# Patient Record
Sex: MALE | Race: WHITE | NOT HISPANIC OR LATINO | URBAN - METROPOLITAN AREA
[De-identification: names, ages, dates, MRNs, and addresses within clinical notes are randomized per-mention and may not be internally consistent; named-entity substitution may affect disease eponyms.]

---

## 2017-02-01 ENCOUNTER — OUTPATIENT (OUTPATIENT)
Dept: OUTPATIENT SERVICES | Facility: HOSPITAL | Age: 82
LOS: 1 days | Discharge: HOME | End: 2017-02-01

## 2017-06-27 DIAGNOSIS — C61 MALIGNANT NEOPLASM OF PROSTATE: ICD-10-CM

## 2017-06-27 PROBLEM — Z00.00 ENCOUNTER FOR PREVENTIVE HEALTH EXAMINATION: Status: ACTIVE | Noted: 2017-06-27

## 2017-08-22 ENCOUNTER — APPOINTMENT (OUTPATIENT)
Dept: VASCULAR SURGERY | Facility: CLINIC | Age: 82
End: 2017-08-22
Payer: MEDICARE

## 2017-08-22 PROCEDURE — 93880 EXTRACRANIAL BILAT STUDY: CPT

## 2017-08-22 PROCEDURE — 99213 OFFICE O/P EST LOW 20 MIN: CPT

## 2018-01-29 ENCOUNTER — OUTPATIENT (OUTPATIENT)
Dept: OUTPATIENT SERVICES | Facility: HOSPITAL | Age: 83
LOS: 1 days | Discharge: HOME | End: 2018-01-29

## 2018-01-29 DIAGNOSIS — C61 MALIGNANT NEOPLASM OF PROSTATE: ICD-10-CM

## 2018-02-04 DIAGNOSIS — M17.10 UNILATERAL PRIMARY OSTEOARTHRITIS, UNSPECIFIED KNEE: ICD-10-CM

## 2018-08-01 ENCOUNTER — APPOINTMENT (OUTPATIENT)
Dept: UROLOGY | Facility: CLINIC | Age: 83
End: 2018-08-01

## 2018-08-14 ENCOUNTER — APPOINTMENT (OUTPATIENT)
Dept: VASCULAR SURGERY | Facility: CLINIC | Age: 83
End: 2018-08-14
Payer: MEDICARE

## 2018-08-14 VITALS
SYSTOLIC BLOOD PRESSURE: 110 MMHG | DIASTOLIC BLOOD PRESSURE: 60 MMHG | WEIGHT: 142 LBS | HEIGHT: 67 IN | BODY MASS INDEX: 22.29 KG/M2

## 2018-08-14 PROCEDURE — 93880 EXTRACRANIAL BILAT STUDY: CPT

## 2018-08-14 PROCEDURE — 99213 OFFICE O/P EST LOW 20 MIN: CPT

## 2018-08-31 ENCOUNTER — APPOINTMENT (OUTPATIENT)
Dept: UROLOGY | Facility: CLINIC | Age: 83
End: 2018-08-31
Payer: MEDICARE

## 2018-08-31 VITALS
DIASTOLIC BLOOD PRESSURE: 61 MMHG | WEIGHT: 142 LBS | HEART RATE: 65 BPM | SYSTOLIC BLOOD PRESSURE: 144 MMHG | BODY MASS INDEX: 22.29 KG/M2 | HEIGHT: 67 IN

## 2018-08-31 DIAGNOSIS — Z86.79 PERSONAL HISTORY OF OTHER DISEASES OF THE CIRCULATORY SYSTEM: ICD-10-CM

## 2018-08-31 DIAGNOSIS — Z95.5 PRESENCE OF CORONARY ANGIOPLASTY IMPLANT AND GRAFT: ICD-10-CM

## 2018-08-31 DIAGNOSIS — Z78.9 OTHER SPECIFIED HEALTH STATUS: ICD-10-CM

## 2018-08-31 DIAGNOSIS — E11.9 TYPE 2 DIABETES MELLITUS W/OUT COMPLICATIONS: ICD-10-CM

## 2018-08-31 DIAGNOSIS — M10.9 GOUT, UNSPECIFIED: ICD-10-CM

## 2018-08-31 DIAGNOSIS — C61 MALIGNANT NEOPLASM OF PROSTATE: ICD-10-CM

## 2018-08-31 LAB
BILIRUB UR QL STRIP: NORMAL
CLARITY UR: CLEAR
COLLECTION METHOD: NORMAL
GLUCOSE UR-MCNC: NORMAL
HCG UR QL: NORMAL EU/DL
HGB UR QL STRIP.AUTO: NORMAL
KETONES UR-MCNC: NORMAL
LEUKOCYTE ESTERASE UR QL STRIP: 500
NITRITE UR QL STRIP: NORMAL
PH UR STRIP: 5
PROT UR STRIP-MCNC: NORMAL
SP GR UR STRIP: 1.02

## 2018-08-31 PROCEDURE — 99213 OFFICE O/P EST LOW 20 MIN: CPT

## 2018-08-31 PROCEDURE — 81003 URINALYSIS AUTO W/O SCOPE: CPT | Mod: QW

## 2018-08-31 NOTE — HISTORY OF PRESENT ILLNESS
[None] : None [FreeTextEntry1] : Keegan is a 89 year old male, with a history of Prostate cancer, s/p brachytherapy in 2004 for [place] 6 cancer. His pre treatment PSA was 6.8 ng/mL. His most recent PSA is 0.01 , from January 2018. Currently, he states he is voiding well and denies any significant irritative/obstructive voiding symptoms. He does have a history of renal stones, likely uric acid, s/p dissolution therapy in the past. He denies any episodes of gross hematuria, renal colic, abdominal/flank pain, dysuria, or further urological/constitutional symptoms.

## 2018-08-31 NOTE — ASSESSMENT
[FreeTextEntry1] : Keegan is a 89 year old male with undetectable PSA s/p brachytherapy in 2004. He is voiding well without any significant irritative/obstructive voiding symptoms. He will follow up in January for PSA testing.

## 2019-01-09 ENCOUNTER — OUTPATIENT (OUTPATIENT)
Dept: OUTPATIENT SERVICES | Facility: HOSPITAL | Age: 84
LOS: 1 days | Discharge: HOME | End: 2019-01-09

## 2019-01-09 DIAGNOSIS — D51.9 VITAMIN B12 DEFICIENCY ANEMIA, UNSPECIFIED: ICD-10-CM

## 2019-01-09 DIAGNOSIS — R94.6 ABNORMAL RESULTS OF THYROID FUNCTION STUDIES: ICD-10-CM

## 2019-01-09 DIAGNOSIS — D52.9 FOLATE DEFICIENCY ANEMIA, UNSPECIFIED: ICD-10-CM

## 2019-01-09 DIAGNOSIS — A53.9 SYPHILIS, UNSPECIFIED: ICD-10-CM

## 2019-01-11 ENCOUNTER — APPOINTMENT (OUTPATIENT)
Dept: UROLOGY | Facility: CLINIC | Age: 84
End: 2019-01-11

## 2019-06-25 ENCOUNTER — APPOINTMENT (OUTPATIENT)
Dept: UROLOGY | Facility: CLINIC | Age: 84
End: 2019-06-25
Payer: MEDICARE

## 2019-06-25 VITALS
HEIGHT: 67 IN | WEIGHT: 142 LBS | SYSTOLIC BLOOD PRESSURE: 162 MMHG | BODY MASS INDEX: 22.29 KG/M2 | HEART RATE: 50 BPM | DIASTOLIC BLOOD PRESSURE: 68 MMHG

## 2019-06-25 DIAGNOSIS — C61 MALIGNANT NEOPLASM OF PROSTATE: ICD-10-CM

## 2019-06-25 PROCEDURE — 99213 OFFICE O/P EST LOW 20 MIN: CPT

## 2019-06-25 NOTE — HISTORY OF PRESENT ILLNESS
[FreeTextEntry1] : 89-year-old with history of prostate cancer Status postBrachii therapy in 2004 for [place] 6 and a pretreatment PSA of 6.8. New PSA is 0.1. He feels well without any frequency or urgency, no dysuria, no gross hematuria, no flank pain, no intermittency.

## 2019-06-25 NOTE — PHYSICAL EXAM
[General Appearance - In No Acute Distress] : no acute distress [Prostate Enlargement] : the prostate was not enlarged [Prostate Tenderness] : the prostate was not tender [No Prostate Nodules] : no prostate nodules [] : no respiratory distress [Oriented To Time, Place, And Person] : oriented to person, place, and time [Normal Station and Gait] : the gait and station were normal for the patient's age

## 2019-10-28 ENCOUNTER — APPOINTMENT (OUTPATIENT)
Dept: CARDIOLOGY | Facility: CLINIC | Age: 84
End: 2019-10-28
Payer: MEDICARE

## 2019-10-28 PROCEDURE — 99214 OFFICE O/P EST MOD 30 MIN: CPT

## 2019-10-28 PROCEDURE — 93000 ELECTROCARDIOGRAM COMPLETE: CPT

## 2019-12-11 ENCOUNTER — APPOINTMENT (OUTPATIENT)
Dept: NEUROLOGY | Facility: CLINIC | Age: 84
End: 2019-12-11
Payer: MEDICARE

## 2019-12-11 VITALS
SYSTOLIC BLOOD PRESSURE: 192 MMHG | HEIGHT: 67 IN | TEMPERATURE: 97.4 F | BODY MASS INDEX: 23.23 KG/M2 | OXYGEN SATURATION: 90 % | WEIGHT: 148 LBS | HEART RATE: 56 BPM | DIASTOLIC BLOOD PRESSURE: 84 MMHG

## 2019-12-11 PROCEDURE — 99213 OFFICE O/P EST LOW 20 MIN: CPT

## 2019-12-11 NOTE — HISTORY OF PRESENT ILLNESS
[FreeTextEntry1] : Pt seen today in f/u for dementia. Is in [facility] now.  Rides elevator up and down.  Meals prepared for him.  Good social interaction.  Very active.  Loves playing cards, making money through winning ($44!).  \par Son took me aside and is very happy with how father is doing in [facility].  Says prior to him going was probably drinking ETOH now no ETOH and flourishing in structured safe environment with many activities and social interaction.

## 2019-12-11 NOTE — PHYSICAL EXAM
[FreeTextEntry1] : Patient very alert and conversational.  He tells me about the card games and that they have him do the dealing.  Tells me he likes to count cards and was always good with numbers.  Repeats himself a few times during the exam.  \par \par Son notes he did not tolerate the 10 mg/day donepezil so decreased back to 5 mg.\par \par *** note that manual BP taken left arm sitting and reading of 165/85 obtained.\par Son reports he had normal BP at PCP's and cardiologists recently.

## 2019-12-11 NOTE — ASSESSMENT
[FreeTextEntry1] : Alzeimer type dementia of moderate severity, slow progression.  He is doing very well at [facility].   No changes in medication are warrented at this time though I did explained memantine was option in future.  Will return in 6 months.  Donepezil 5 mg/day was refilled.

## 2020-09-18 ENCOUNTER — APPOINTMENT (OUTPATIENT)
Dept: NEUROLOGY | Facility: CLINIC | Age: 85
End: 2020-09-18
Payer: MEDICARE

## 2020-09-18 VITALS
WEIGHT: 148 LBS | OXYGEN SATURATION: 98 % | SYSTOLIC BLOOD PRESSURE: 191 MMHG | TEMPERATURE: 98 F | HEART RATE: 80 BPM | DIASTOLIC BLOOD PRESSURE: 90 MMHG | BODY MASS INDEX: 23.23 KG/M2 | HEIGHT: 67 IN

## 2020-09-18 VITALS — HEART RATE: 71 BPM | DIASTOLIC BLOOD PRESSURE: 90 MMHG | SYSTOLIC BLOOD PRESSURE: 182 MMHG

## 2020-09-18 PROCEDURE — 99214 OFFICE O/P EST MOD 30 MIN: CPT

## 2020-09-18 NOTE — PHYSICAL EXAM
[FreeTextEntry1] : BP = 164/77 P = 60\par \par Speech is fluent, talks about cards a lot.\par Gait wide based, turns mildly unsteady.\par \par Month is ?\par Year is ?  Born [birth year]\par YOSHI Rebollar (states knew him when he was 2 or 3 year old came into his store trying to bargain for things).\par \par

## 2020-09-18 NOTE — ASSESSMENT
[FreeTextEntry1] : Alzheimer disease, stable on low dose donepezil.  No medication changes needed at this time.\par Return in 6 months.  Encouraged to continue to use walker at night.

## 2020-09-18 NOTE — HISTORY OF PRESENT ILLNESS
[FreeTextEntry1] : Pt 89 yo male with dementia, AD, no behavior disturbance.  [facility] but Cov[facility] restrictions put card playing on hold.  BP tends to be higher in doctors office.  Goes up when walks from car to office.\par \par Takes meds, Has pill box which his son loads for him.  Son states no errors.  No falls.\par Has a walker, uses it to go to bathroom at night.  Has night lights.\par \par

## 2020-10-08 ENCOUNTER — RECORD ABSTRACTING (OUTPATIENT)
Age: 85
End: 2020-10-08

## 2020-10-08 DIAGNOSIS — Z86.79 PERSONAL HISTORY OF OTHER DISEASES OF THE CIRCULATORY SYSTEM: ICD-10-CM

## 2020-10-08 DIAGNOSIS — I21.4 NON-ST ELEVATION (NSTEMI) MYOCARDIAL INFARCTION: ICD-10-CM

## 2020-10-08 DIAGNOSIS — Z87.891 PERSONAL HISTORY OF NICOTINE DEPENDENCE: ICD-10-CM

## 2020-10-08 DIAGNOSIS — I49.3 VENTRICULAR PREMATURE DEPOLARIZATION: ICD-10-CM

## 2020-10-08 DIAGNOSIS — Z86.39 PERSONAL HISTORY OF OTHER ENDOCRINE, NUTRITIONAL AND METABOLIC DISEASE: ICD-10-CM

## 2020-10-08 DIAGNOSIS — Z87.39 PERSONAL HISTORY OF OTHER DISEASES OF THE MUSCULOSKELETAL SYSTEM AND CONNECTIVE TISSUE: ICD-10-CM

## 2020-10-08 DIAGNOSIS — Z85.46 PERSONAL HISTORY OF MALIGNANT NEOPLASM OF PROSTATE: ICD-10-CM

## 2020-10-08 RX ORDER — HYDROCHLOROTHIAZIDE 12.5 MG/1
12.5 CAPSULE ORAL DAILY
Refills: 0 | Status: ACTIVE | COMMUNITY

## 2020-10-08 RX ORDER — GLYBURIDE 1.25 MG/1
1.25 TABLET ORAL DAILY
Refills: 0 | Status: ACTIVE | COMMUNITY

## 2020-10-08 RX ORDER — VALSARTAN 320 MG/1
320 TABLET ORAL DAILY
Refills: 0 | Status: ACTIVE | COMMUNITY

## 2020-10-08 RX ORDER — ASPIRIN 81 MG
81 TABLET, DELAYED RELEASE (ENTERIC COATED) ORAL DAILY
Refills: 0 | Status: ACTIVE | COMMUNITY

## 2020-10-08 RX ORDER — METOPROLOL TARTRATE 100 MG/1
100 TABLET, FILM COATED ORAL DAILY
Refills: 0 | Status: ACTIVE | COMMUNITY

## 2020-10-08 RX ORDER — CLOPIDOGREL 75 MG/1
75 TABLET, FILM COATED ORAL DAILY
Qty: 90 | Refills: 3 | Status: ACTIVE | COMMUNITY

## 2020-10-08 RX ORDER — SIMVASTATIN 40 MG/1
40 TABLET, FILM COATED ORAL DAILY
Refills: 0 | Status: ACTIVE | COMMUNITY

## 2020-10-08 RX ORDER — NITROGLYCERIN 0.4 MG/1
0.4 TABLET SUBLINGUAL
Refills: 0 | Status: ACTIVE | COMMUNITY

## 2020-10-08 RX ORDER — AMLODIPINE BESYLATE 2.5 MG/1
2.5 TABLET ORAL DAILY
Refills: 0 | Status: ACTIVE | COMMUNITY

## 2021-03-22 ENCOUNTER — APPOINTMENT (OUTPATIENT)
Dept: NEUROLOGY | Facility: CLINIC | Age: 86
End: 2021-03-22
Payer: MEDICARE

## 2021-03-22 VITALS
DIASTOLIC BLOOD PRESSURE: 93 MMHG | HEIGHT: 66 IN | OXYGEN SATURATION: 96 % | BODY MASS INDEX: 22.82 KG/M2 | WEIGHT: 142 LBS | SYSTOLIC BLOOD PRESSURE: 172 MMHG | TEMPERATURE: 97.7 F | HEART RATE: 97 BPM

## 2021-03-22 DIAGNOSIS — G30.9 ALZHEIMER'S DISEASE, UNSPECIFIED: ICD-10-CM

## 2021-03-22 DIAGNOSIS — F02.80 ALZHEIMER'S DISEASE, UNSPECIFIED: ICD-10-CM

## 2021-03-22 PROCEDURE — 99213 OFFICE O/P EST LOW 20 MIN: CPT

## 2021-03-22 RX ORDER — ALLOPURINOL 200 MG/1
TABLET ORAL DAILY
Refills: 0 | Status: ACTIVE | COMMUNITY

## 2021-03-22 NOTE — HISTORY OF PRESENT ILLNESS
[FreeTextEntry1] : Pt presents for f/u of AD.  He is taking donepezil and last time at cardiologist no worsening of his condition.\par \par In assisted living at [facility].  They clean his room and do his laundry.  Goes down Wed, Friday, Sunday for meals.   Had both vaccines (second shot was a week ago).  Son states they will be opening up to card games and visitors starting April 1.\par

## 2021-03-22 NOTE — ASSESSMENT
[FreeTextEntry1] : Dementia, stable.  He is on low dose donepezil and tolerating it (no bradycardia, pulse was 97 today).\par Given that he is on metoprolol and otherwise doing well I would not recommend any change in dosing of donepezil.  Also I would not add memantine at this time.\par \par I encouraged him to remain physically and socially active.\par Refill of donepezil 5 mg tabs provided.\par Return in 6 months.

## 2021-06-14 ENCOUNTER — APPOINTMENT (OUTPATIENT)
Dept: CARDIOLOGY | Facility: CLINIC | Age: 86
End: 2021-06-14
Payer: MEDICARE

## 2021-06-14 VITALS
DIASTOLIC BLOOD PRESSURE: 80 MMHG | WEIGHT: 145 LBS | BODY MASS INDEX: 23.3 KG/M2 | TEMPERATURE: 96.7 F | SYSTOLIC BLOOD PRESSURE: 130 MMHG | HEIGHT: 66 IN | HEART RATE: 55 BPM

## 2021-06-14 DIAGNOSIS — I10 ESSENTIAL (PRIMARY) HYPERTENSION: ICD-10-CM

## 2021-06-14 DIAGNOSIS — E78.2 MIXED HYPERLIPIDEMIA: ICD-10-CM

## 2021-06-14 DIAGNOSIS — I65.23 OCCLUSION AND STENOSIS OF BILATERAL CAROTID ARTERIES: ICD-10-CM

## 2021-06-14 DIAGNOSIS — I25.10 ATHEROSCLEROTIC HEART DISEASE OF NATIVE CORONARY ARTERY W/OUT ANGINA PECTORIS: ICD-10-CM

## 2021-06-14 PROCEDURE — 99213 OFFICE O/P EST LOW 20 MIN: CPT

## 2021-06-14 PROCEDURE — 93000 ELECTROCARDIOGRAM COMPLETE: CPT

## 2021-06-14 NOTE — ASSESSMENT
[FreeTextEntry1] :  S/P NSTEMI 08\par S/P stent LAD and RCA\par always flat st changes laterally \par Dx with Alzheimers\par HTN\par sinus josie at 50-55\par NIDDM\par PVC \par carotid left surg\par Gout \par Prostate Ca  \par clinically stable\par wife  2018 due to myeloma \par may need to decrease metoptolol and aricept  in future for now no change heart rate 55 \par \par

## 2021-12-10 ENCOUNTER — APPOINTMENT (OUTPATIENT)
Dept: NEUROLOGY | Facility: CLINIC | Age: 86
End: 2021-12-10
Payer: MEDICARE

## 2021-12-10 VITALS
SYSTOLIC BLOOD PRESSURE: 194 MMHG | HEIGHT: 66 IN | BODY MASS INDEX: 23.95 KG/M2 | DIASTOLIC BLOOD PRESSURE: 82 MMHG | TEMPERATURE: 98.1 F | WEIGHT: 149 LBS | OXYGEN SATURATION: 98 % | HEART RATE: 74 BPM

## 2021-12-10 DIAGNOSIS — G30.1 ALZHEIMER'S DISEASE WITH LATE ONSET: ICD-10-CM

## 2021-12-10 DIAGNOSIS — F02.80 ALZHEIMER'S DISEASE WITH LATE ONSET: ICD-10-CM

## 2021-12-10 PROCEDURE — 99214 OFFICE O/P EST MOD 30 MIN: CPT

## 2021-12-10 RX ORDER — MEMANTINE HYDROCHLORIDE 5 MG/1
5 TABLET, FILM COATED ORAL DAILY
Qty: 90 | Refills: 3 | Status: ACTIVE | COMMUNITY
Start: 2021-12-10 | End: 1900-01-01

## 2021-12-10 RX ORDER — DONEPEZIL HYDROCHLORIDE 5 MG/1
5 TABLET ORAL
Qty: 90 | Refills: 3 | Status: ACTIVE | COMMUNITY
Start: 2019-12-11 | End: 1900-01-01

## 2021-12-10 NOTE — PHYSICAL EXAM
[FreeTextEntry1] : Patient is alert,  repeats self several times during exam.  He is in good spirits.\par Follows commands.\par Has good upper and lower extremity strength.

## 2021-12-10 NOTE — HISTORY OF PRESENT ILLNESS
[FreeTextEntry1] : Pt presents for f/u of AD.  He comes in with his son who notes some further decline in short term memory.\par

## 2021-12-10 NOTE — ASSESSMENT
[FreeTextEntry1] : Alzheimer type dementia, continued gradual progression. \par He is doing well, enjoys playing cards and reminiscing about past.\par Stays physically and socially active.\par BP today was high but son says it is normal in most other doctors offices.\par \par Plan:\par 1.  Continue donepezil 5 mg/day.\par 2.  F/u with PCP for treatment of hypertension.\par 3.  Begin memantine 5 mg /day after ensuring BP control improved.\par 4.  Return in 6 months.

## 2022-01-10 ENCOUNTER — APPOINTMENT (OUTPATIENT)
Dept: CARDIOLOGY | Facility: CLINIC | Age: 87
End: 2022-01-10

## 2022-04-22 ENCOUNTER — APPOINTMENT (OUTPATIENT)
Dept: CARDIOLOGY | Facility: CLINIC | Age: 87
End: 2022-04-22

## 2022-05-13 ENCOUNTER — INPATIENT (INPATIENT)
Facility: HOSPITAL | Age: 87
LOS: 5 days | Discharge: NURSING, REHAB OR LONG-TERM CARE | End: 2022-05-19
Attending: HOSPITALIST | Admitting: HOSPITALIST
Payer: MEDICARE

## 2022-05-13 VITALS
TEMPERATURE: 99 F | HEART RATE: 105 BPM | DIASTOLIC BLOOD PRESSURE: 81 MMHG | RESPIRATION RATE: 16 BRPM | OXYGEN SATURATION: 98 % | SYSTOLIC BLOOD PRESSURE: 135 MMHG

## 2022-05-13 LAB
A1C WITH ESTIMATED AVERAGE GLUCOSE RESULT: 7.3 % — HIGH (ref 4–5.6)
ALBUMIN SERPL ELPH-MCNC: 3.8 G/DL — SIGNIFICANT CHANGE UP (ref 3.5–5.2)
ALBUMIN SERPL ELPH-MCNC: 4.6 G/DL — SIGNIFICANT CHANGE UP (ref 3.5–5.2)
ALP SERPL-CCNC: 74 U/L — SIGNIFICANT CHANGE UP (ref 30–115)
ALP SERPL-CCNC: 83 U/L — SIGNIFICANT CHANGE UP (ref 30–115)
ALT FLD-CCNC: 20 U/L — SIGNIFICANT CHANGE UP (ref 0–41)
ALT FLD-CCNC: 21 U/L — SIGNIFICANT CHANGE UP (ref 0–41)
ANION GAP SERPL CALC-SCNC: 17 MMOL/L — HIGH (ref 7–14)
ANION GAP SERPL CALC-SCNC: 18 MMOL/L — HIGH (ref 7–14)
APPEARANCE UR: ABNORMAL
APTT BLD: 36 SEC — SIGNIFICANT CHANGE UP (ref 27–39.2)
AST SERPL-CCNC: 39 U/L — SIGNIFICANT CHANGE UP (ref 0–41)
AST SERPL-CCNC: 45 U/L — HIGH (ref 0–41)
BACTERIA # UR AUTO: ABNORMAL
BASE EXCESS BLDV CALC-SCNC: -1 MMOL/L — SIGNIFICANT CHANGE UP (ref -2–3)
BASOPHILS # BLD AUTO: 0.02 K/UL — SIGNIFICANT CHANGE UP (ref 0–0.2)
BASOPHILS # BLD AUTO: 0.02 K/UL — SIGNIFICANT CHANGE UP (ref 0–0.2)
BASOPHILS NFR BLD AUTO: 0.1 % — SIGNIFICANT CHANGE UP (ref 0–1)
BASOPHILS NFR BLD AUTO: 0.2 % — SIGNIFICANT CHANGE UP (ref 0–1)
BILIRUB SERPL-MCNC: 1.7 MG/DL — HIGH (ref 0.2–1.2)
BILIRUB SERPL-MCNC: 1.9 MG/DL — HIGH (ref 0.2–1.2)
BILIRUB UR-MCNC: NEGATIVE — SIGNIFICANT CHANGE UP
BLD GP AB SCN SERPL QL: SIGNIFICANT CHANGE UP
BUN SERPL-MCNC: 24 MG/DL — HIGH (ref 10–20)
BUN SERPL-MCNC: 26 MG/DL — HIGH (ref 10–20)
CA-I SERPL-SCNC: 1.09 MMOL/L — LOW (ref 1.15–1.33)
CALCIUM SERPL-MCNC: 9 MG/DL — SIGNIFICANT CHANGE UP (ref 8.5–10.1)
CALCIUM SERPL-MCNC: 9 MG/DL — SIGNIFICANT CHANGE UP (ref 8.5–10.1)
CHLORIDE SERPL-SCNC: 100 MMOL/L — SIGNIFICANT CHANGE UP (ref 98–110)
CHLORIDE SERPL-SCNC: 99 MMOL/L — SIGNIFICANT CHANGE UP (ref 98–110)
CK SERPL-CCNC: 1332 U/L — HIGH (ref 0–225)
CK SERPL-CCNC: 1999 U/L — HIGH (ref 0–225)
CO2 SERPL-SCNC: 20 MMOL/L — SIGNIFICANT CHANGE UP (ref 17–32)
CO2 SERPL-SCNC: 21 MMOL/L — SIGNIFICANT CHANGE UP (ref 17–32)
COLOR SPEC: YELLOW — SIGNIFICANT CHANGE UP
CREAT SERPL-MCNC: 1 MG/DL — SIGNIFICANT CHANGE UP (ref 0.7–1.5)
CREAT SERPL-MCNC: 1.1 MG/DL — SIGNIFICANT CHANGE UP (ref 0.7–1.5)
DIFF PNL FLD: ABNORMAL
EGFR: 62 ML/MIN/1.73M2 — SIGNIFICANT CHANGE UP
EGFR: 70 ML/MIN/1.73M2 — SIGNIFICANT CHANGE UP
EOSINOPHIL # BLD AUTO: 0 K/UL — SIGNIFICANT CHANGE UP (ref 0–0.7)
EOSINOPHIL # BLD AUTO: 0.03 K/UL — SIGNIFICANT CHANGE UP (ref 0–0.7)
EOSINOPHIL NFR BLD AUTO: 0 % — SIGNIFICANT CHANGE UP (ref 0–8)
EOSINOPHIL NFR BLD AUTO: 0.2 % — SIGNIFICANT CHANGE UP (ref 0–8)
EPI CELLS # UR: 0 /HPF — SIGNIFICANT CHANGE UP (ref 0–5)
ESTIMATED AVERAGE GLUCOSE: 163 MG/DL — HIGH (ref 68–114)
ETHANOL SERPL-MCNC: <10 MG/DL — SIGNIFICANT CHANGE UP
GAS PNL BLDV: 130 MMOL/L — LOW (ref 136–145)
GAS PNL BLDV: SIGNIFICANT CHANGE UP
GLUCOSE SERPL-MCNC: 176 MG/DL — HIGH (ref 70–99)
GLUCOSE SERPL-MCNC: 220 MG/DL — HIGH (ref 70–99)
GLUCOSE UR QL: ABNORMAL
HCO3 BLDV-SCNC: 25 MMOL/L — SIGNIFICANT CHANGE UP (ref 22–29)
HCT VFR BLD CALC: 47.2 % — SIGNIFICANT CHANGE UP (ref 42–52)
HCT VFR BLD CALC: 49.2 % — SIGNIFICANT CHANGE UP (ref 42–52)
HCT VFR BLDA CALC: 51 % — SIGNIFICANT CHANGE UP (ref 39–51)
HGB BLD CALC-MCNC: 17 G/DL — SIGNIFICANT CHANGE UP (ref 12.6–17.4)
HGB BLD-MCNC: 16.8 G/DL — SIGNIFICANT CHANGE UP (ref 14–18)
HGB BLD-MCNC: 17.8 G/DL — SIGNIFICANT CHANGE UP (ref 14–18)
HYALINE CASTS # UR AUTO: 1 /LPF — SIGNIFICANT CHANGE UP (ref 0–7)
IMM GRANULOCYTES NFR BLD AUTO: 0.6 % — HIGH (ref 0.1–0.3)
IMM GRANULOCYTES NFR BLD AUTO: 0.8 % — HIGH (ref 0.1–0.3)
INR BLD: 0.99 RATIO — SIGNIFICANT CHANGE UP (ref 0.65–1.3)
KETONES UR-MCNC: ABNORMAL
LACTATE BLDV-MCNC: 3.1 MMOL/L — HIGH (ref 0.5–2)
LACTATE SERPL-SCNC: 3.4 MMOL/L — HIGH (ref 0.7–2)
LACTATE SERPL-SCNC: 4.1 MMOL/L — CRITICAL HIGH (ref 0.7–2)
LEUKOCYTE ESTERASE UR-ACNC: ABNORMAL
LYMPHOCYTES # BLD AUTO: 0.53 K/UL — LOW (ref 1.2–3.4)
LYMPHOCYTES # BLD AUTO: 0.73 K/UL — LOW (ref 1.2–3.4)
LYMPHOCYTES # BLD AUTO: 3.3 % — LOW (ref 20.5–51.1)
LYMPHOCYTES # BLD AUTO: 5.8 % — LOW (ref 20.5–51.1)
MAGNESIUM SERPL-MCNC: 1.7 MG/DL — LOW (ref 1.8–2.4)
MCHC RBC-ENTMCNC: 29.8 PG — SIGNIFICANT CHANGE UP (ref 27–31)
MCHC RBC-ENTMCNC: 30.4 PG — SIGNIFICANT CHANGE UP (ref 27–31)
MCHC RBC-ENTMCNC: 35.6 G/DL — SIGNIFICANT CHANGE UP (ref 32–37)
MCHC RBC-ENTMCNC: 36.2 G/DL — SIGNIFICANT CHANGE UP (ref 32–37)
MCV RBC AUTO: 83.8 FL — SIGNIFICANT CHANGE UP (ref 80–94)
MCV RBC AUTO: 84 FL — SIGNIFICANT CHANGE UP (ref 80–94)
MONOCYTES # BLD AUTO: 0.96 K/UL — HIGH (ref 0.1–0.6)
MONOCYTES # BLD AUTO: 1.18 K/UL — HIGH (ref 0.1–0.6)
MONOCYTES NFR BLD AUTO: 6.1 % — SIGNIFICANT CHANGE UP (ref 1.7–9.3)
MONOCYTES NFR BLD AUTO: 9.4 % — HIGH (ref 1.7–9.3)
NEUTROPHILS # BLD AUTO: 10.48 K/UL — HIGH (ref 1.4–6.5)
NEUTROPHILS # BLD AUTO: 14.21 K/UL — HIGH (ref 1.4–6.5)
NEUTROPHILS NFR BLD AUTO: 83.8 % — HIGH (ref 42.2–75.2)
NEUTROPHILS NFR BLD AUTO: 89.7 % — HIGH (ref 42.2–75.2)
NITRITE UR-MCNC: POSITIVE
NRBC # BLD: 0 /100 WBCS — SIGNIFICANT CHANGE UP (ref 0–0)
NRBC # BLD: 0 /100 WBCS — SIGNIFICANT CHANGE UP (ref 0–0)
PCO2 BLDV: 47 MMHG — SIGNIFICANT CHANGE UP (ref 42–55)
PH BLDV: 7.34 — SIGNIFICANT CHANGE UP (ref 7.32–7.43)
PH UR: 6 — SIGNIFICANT CHANGE UP (ref 5–8)
PLATELET # BLD AUTO: 188 K/UL — SIGNIFICANT CHANGE UP (ref 130–400)
PLATELET # BLD AUTO: 209 K/UL — SIGNIFICANT CHANGE UP (ref 130–400)
PO2 BLDV: 32 MMHG — SIGNIFICANT CHANGE UP
POTASSIUM BLDV-SCNC: 4.4 MMOL/L — SIGNIFICANT CHANGE UP (ref 3.5–5.1)
POTASSIUM SERPL-MCNC: 3.9 MMOL/L — SIGNIFICANT CHANGE UP (ref 3.5–5)
POTASSIUM SERPL-MCNC: 5.1 MMOL/L — HIGH (ref 3.5–5)
POTASSIUM SERPL-SCNC: 3.9 MMOL/L — SIGNIFICANT CHANGE UP (ref 3.5–5)
POTASSIUM SERPL-SCNC: 5.1 MMOL/L — HIGH (ref 3.5–5)
PROT SERPL-MCNC: 5.8 G/DL — LOW (ref 6–8)
PROT SERPL-MCNC: 7.1 G/DL — SIGNIFICANT CHANGE UP (ref 6–8)
PROT UR-MCNC: ABNORMAL
PROTHROM AB SERPL-ACNC: 11.4 SEC — SIGNIFICANT CHANGE UP (ref 9.95–12.87)
RBC # BLD: 5.63 M/UL — SIGNIFICANT CHANGE UP (ref 4.7–6.1)
RBC # BLD: 5.86 M/UL — SIGNIFICANT CHANGE UP (ref 4.7–6.1)
RBC # FLD: 13.2 % — SIGNIFICANT CHANGE UP (ref 11.5–14.5)
RBC # FLD: 13.4 % — SIGNIFICANT CHANGE UP (ref 11.5–14.5)
RBC CASTS # UR COMP ASSIST: 18 /HPF — HIGH (ref 0–4)
SAO2 % BLDV: 57.2 % — SIGNIFICANT CHANGE UP
SARS-COV-2 RNA SPEC QL NAA+PROBE: SIGNIFICANT CHANGE UP
SODIUM SERPL-SCNC: 137 MMOL/L — SIGNIFICANT CHANGE UP (ref 135–146)
SODIUM SERPL-SCNC: 138 MMOL/L — SIGNIFICANT CHANGE UP (ref 135–146)
SP GR SPEC: 1.02 — SIGNIFICANT CHANGE UP (ref 1.01–1.03)
TROPONIN T SERPL-MCNC: 0.02 NG/ML — HIGH
TROPONIN T SERPL-MCNC: <0.01 NG/ML — SIGNIFICANT CHANGE UP
UROBILINOGEN FLD QL: SIGNIFICANT CHANGE UP
WBC # BLD: 12.51 K/UL — HIGH (ref 4.8–10.8)
WBC # BLD: 15.84 K/UL — HIGH (ref 4.8–10.8)
WBC # FLD AUTO: 12.51 K/UL — HIGH (ref 4.8–10.8)
WBC # FLD AUTO: 15.84 K/UL — HIGH (ref 4.8–10.8)
WBC UR QL: 171 /HPF — HIGH (ref 0–5)

## 2022-05-13 PROCEDURE — 74176 CT ABD & PELVIS W/O CONTRAST: CPT | Mod: 26,MA

## 2022-05-13 PROCEDURE — 99497 ADVNCD CARE PLAN 30 MIN: CPT | Mod: 25

## 2022-05-13 PROCEDURE — 93010 ELECTROCARDIOGRAM REPORT: CPT

## 2022-05-13 PROCEDURE — 71045 X-RAY EXAM CHEST 1 VIEW: CPT | Mod: 26

## 2022-05-13 PROCEDURE — 99223 1ST HOSP IP/OBS HIGH 75: CPT | Mod: AI

## 2022-05-13 PROCEDURE — 99222 1ST HOSP IP/OBS MODERATE 55: CPT

## 2022-05-13 PROCEDURE — 71250 CT THORAX DX C-: CPT | Mod: 26,MA

## 2022-05-13 PROCEDURE — 72125 CT NECK SPINE W/O DYE: CPT | Mod: 26,MA

## 2022-05-13 PROCEDURE — 99285 EMERGENCY DEPT VISIT HI MDM: CPT | Mod: CS,GC

## 2022-05-13 PROCEDURE — 72170 X-RAY EXAM OF PELVIS: CPT | Mod: 26

## 2022-05-13 PROCEDURE — 99223 1ST HOSP IP/OBS HIGH 75: CPT

## 2022-05-13 PROCEDURE — 70450 CT HEAD/BRAIN W/O DYE: CPT | Mod: 26,MA

## 2022-05-13 PROCEDURE — 70486 CT MAXILLOFACIAL W/O DYE: CPT | Mod: 26,MA

## 2022-05-13 RX ORDER — CEFEPIME 1 G/1
1000 INJECTION, POWDER, FOR SOLUTION INTRAMUSCULAR; INTRAVENOUS ONCE
Refills: 0 | Status: COMPLETED | OUTPATIENT
Start: 2022-05-13 | End: 2022-05-13

## 2022-05-13 RX ORDER — CEFEPIME 1 G/1
1000 INJECTION, POWDER, FOR SOLUTION INTRAMUSCULAR; INTRAVENOUS EVERY 24 HOURS
Refills: 0 | Status: DISCONTINUED | OUTPATIENT
Start: 2022-05-14 | End: 2022-05-15

## 2022-05-13 RX ORDER — CLOPIDOGREL BISULFATE 75 MG/1
1 TABLET, FILM COATED ORAL
Qty: 0 | Refills: 0 | DISCHARGE

## 2022-05-13 RX ORDER — ALLOPURINOL 300 MG
100 TABLET ORAL
Refills: 0 | Status: DISCONTINUED | OUTPATIENT
Start: 2022-05-13 | End: 2022-05-19

## 2022-05-13 RX ORDER — DONEPEZIL HYDROCHLORIDE 10 MG/1
5 TABLET, FILM COATED ORAL AT BEDTIME
Refills: 0 | Status: DISCONTINUED | OUTPATIENT
Start: 2022-05-13 | End: 2022-05-19

## 2022-05-13 RX ORDER — MAGNESIUM SULFATE 500 MG/ML
2 VIAL (ML) INJECTION ONCE
Refills: 0 | Status: COMPLETED | OUTPATIENT
Start: 2022-05-13 | End: 2022-05-13

## 2022-05-13 RX ORDER — DONEPEZIL HYDROCHLORIDE 10 MG/1
1 TABLET, FILM COATED ORAL
Qty: 0 | Refills: 0 | DISCHARGE

## 2022-05-13 RX ORDER — VANCOMYCIN HCL 1 G
1000 VIAL (EA) INTRAVENOUS EVERY 12 HOURS
Refills: 0 | Status: DISCONTINUED | OUTPATIENT
Start: 2022-05-14 | End: 2022-05-15

## 2022-05-13 RX ORDER — PANTOPRAZOLE SODIUM 20 MG/1
40 TABLET, DELAYED RELEASE ORAL
Refills: 0 | Status: DISCONTINUED | OUTPATIENT
Start: 2022-05-13 | End: 2022-05-19

## 2022-05-13 RX ORDER — SODIUM CHLORIDE 9 MG/ML
250 INJECTION INTRAMUSCULAR; INTRAVENOUS; SUBCUTANEOUS ONCE
Refills: 0 | Status: COMPLETED | OUTPATIENT
Start: 2022-05-13 | End: 2022-05-13

## 2022-05-13 RX ORDER — SODIUM CHLORIDE 9 MG/ML
1000 INJECTION, SOLUTION INTRAVENOUS ONCE
Refills: 0 | Status: COMPLETED | OUTPATIENT
Start: 2022-05-13 | End: 2022-05-13

## 2022-05-13 RX ORDER — CEFEPIME 1 G/1
INJECTION, POWDER, FOR SOLUTION INTRAMUSCULAR; INTRAVENOUS
Refills: 0 | Status: DISCONTINUED | OUTPATIENT
Start: 2022-05-13 | End: 2022-05-15

## 2022-05-13 RX ORDER — CLOPIDOGREL BISULFATE 75 MG/1
75 TABLET, FILM COATED ORAL DAILY
Refills: 0 | Status: DISCONTINUED | OUTPATIENT
Start: 2022-05-13 | End: 2022-05-19

## 2022-05-13 RX ORDER — GLYBURIDE 5 MG
1 TABLET ORAL
Qty: 0 | Refills: 0 | DISCHARGE

## 2022-05-13 RX ORDER — ALLOPURINOL 300 MG
1 TABLET ORAL
Qty: 0 | Refills: 0 | DISCHARGE

## 2022-05-13 RX ORDER — METOPROLOL TARTRATE 50 MG
100 TABLET ORAL DAILY
Refills: 0 | Status: DISCONTINUED | OUTPATIENT
Start: 2022-05-13 | End: 2022-05-16

## 2022-05-13 RX ORDER — AMLODIPINE BESYLATE 2.5 MG/1
2.5 TABLET ORAL DAILY
Refills: 0 | Status: DISCONTINUED | OUTPATIENT
Start: 2022-05-13 | End: 2022-05-16

## 2022-05-13 RX ORDER — SODIUM CHLORIDE 9 MG/ML
1000 INJECTION INTRAMUSCULAR; INTRAVENOUS; SUBCUTANEOUS
Refills: 0 | Status: DISCONTINUED | OUTPATIENT
Start: 2022-05-13 | End: 2022-05-15

## 2022-05-13 RX ORDER — VANCOMYCIN HCL 1 G
1000 VIAL (EA) INTRAVENOUS ONCE
Refills: 0 | Status: COMPLETED | OUTPATIENT
Start: 2022-05-13 | End: 2022-05-13

## 2022-05-13 RX ORDER — SIMVASTATIN 20 MG/1
40 TABLET, FILM COATED ORAL AT BEDTIME
Refills: 0 | Status: DISCONTINUED | OUTPATIENT
Start: 2022-05-13 | End: 2022-05-19

## 2022-05-13 RX ORDER — SODIUM CHLORIDE 9 MG/ML
1000 INJECTION INTRAMUSCULAR; INTRAVENOUS; SUBCUTANEOUS
Refills: 0 | Status: DISCONTINUED | OUTPATIENT
Start: 2022-05-13 | End: 2022-05-13

## 2022-05-13 RX ORDER — ENOXAPARIN SODIUM 100 MG/ML
40 INJECTION SUBCUTANEOUS EVERY 24 HOURS
Refills: 0 | Status: DISCONTINUED | OUTPATIENT
Start: 2022-05-13 | End: 2022-05-19

## 2022-05-13 RX ORDER — SODIUM CHLORIDE 9 MG/ML
500 INJECTION INTRAMUSCULAR; INTRAVENOUS; SUBCUTANEOUS ONCE
Refills: 0 | Status: DISCONTINUED | OUTPATIENT
Start: 2022-05-13 | End: 2022-05-13

## 2022-05-13 RX ORDER — VANCOMYCIN HCL 1 G
VIAL (EA) INTRAVENOUS
Refills: 0 | Status: DISCONTINUED | OUTPATIENT
Start: 2022-05-13 | End: 2022-05-15

## 2022-05-13 RX ORDER — CEFTRIAXONE 500 MG/1
2000 INJECTION, POWDER, FOR SOLUTION INTRAMUSCULAR; INTRAVENOUS ONCE
Refills: 0 | Status: COMPLETED | OUTPATIENT
Start: 2022-05-13 | End: 2022-05-13

## 2022-05-13 RX ADMIN — SODIUM CHLORIDE 250 MILLILITER(S): 9 INJECTION INTRAMUSCULAR; INTRAVENOUS; SUBCUTANEOUS at 09:20

## 2022-05-13 RX ADMIN — Medication 250 MILLIGRAM(S): at 18:59

## 2022-05-13 RX ADMIN — SODIUM CHLORIDE 1000 MILLILITER(S): 9 INJECTION INTRAMUSCULAR; INTRAVENOUS; SUBCUTANEOUS at 23:50

## 2022-05-13 RX ADMIN — CEFEPIME 100 MILLIGRAM(S): 1 INJECTION, POWDER, FOR SOLUTION INTRAMUSCULAR; INTRAVENOUS at 18:59

## 2022-05-13 RX ADMIN — CEFTRIAXONE 100 MILLIGRAM(S): 500 INJECTION, POWDER, FOR SOLUTION INTRAMUSCULAR; INTRAVENOUS at 11:08

## 2022-05-13 RX ADMIN — Medication 25 GRAM(S): at 23:07

## 2022-05-13 RX ADMIN — SODIUM CHLORIDE 75 MILLILITER(S): 9 INJECTION INTRAMUSCULAR; INTRAVENOUS; SUBCUTANEOUS at 18:31

## 2022-05-13 RX ADMIN — SODIUM CHLORIDE 1000 MILLILITER(S): 9 INJECTION, SOLUTION INTRAVENOUS at 11:21

## 2022-05-13 NOTE — ED PROVIDER NOTE - PROGRESS NOTE DETAILS
TELEPHONE D/W ARETHA KELLY. L CLAVICLE FX ON CT SCAN, L ARM SLING APPLIED. RESULTS D/W SON AT BEDSIDE.

## 2022-05-13 NOTE — H&P ADULT - NSHPPHYSICALEXAM_GEN_ALL_CORE
GENERAL: resting in bed comfortably, ill-appearing  HEENT: right eyelid erythema with mild swelling; right sided forehead abrasions, healing  PULMONARY: Clear to auscultation bilaterally. No rales, ronchi, or wheezing.   CARDIOVASCULAR: Regular rate and rhythm, S1-S2, no murmurs   GASTROINTESTINAL: Soft, non-tender, non-distended, no guarding.   SKIN/EXTREMITIES: No LE edema b/l  NEUROLOGIC: AAOX1

## 2022-05-13 NOTE — CONSULT NOTE ADULT - ATTENDING COMMENTS
patient seen and examined and examined agree above note   clarify DNR/DNI family want DNR/ DNI   IV fluid   follow is and os   monitor lytes   repeat bmp follow K   follow ck level   bld cx procal   cefepime vanco for now
This is 89y old m s/p found down at his independent nursing home. Patient is on Plavix and his last known well was last night. There are no external signs of trauma however it does appear that he has been down for some time on his right side as his hip, elbow, knee and ankle on the right side all show some erythema. He is opening his eyes on his own and mumbling words but is otherwise not able to follow commands.     Primary and secondary surveys were performed.    Awake, lethargic.  GCS 12.  Neuro: moves all 4 extremities.  Neck: no step-offs.  Right periorbital swelling.  Chest: bilateral breath sounds present  CV : RRR  Abdomen: soft, nontender  Extr: Right shoulder, knee and ankle mild erythema.         No pain to left upper extremity and left shoulder.    All images and labs were reviewed.    ASSESSMENT:  89 y/omale, S/P Fall.  Possible syncope.  Closed head injury.  AMS.  UTI present on admission.  Frail elderly.    PLAN:  Patient was observed over several hours and remained hemodynamically stable.  No acute traumatic injuries were found on clinical exam and imaging studies.  CT Chest suggest left distal clavicle fragmentation. Patient has no pain in this area.  I suggest XRS of Left shoulder and Ortho evaluation.  Medical management for syncopal workup.  Will follow for tertiary survey.

## 2022-05-13 NOTE — H&P ADULT - TIME BILLING
HPI as above.  Interval history: Pt seen and examined at bedside. No cp or sob.   Vital Signs (24 Hrs):  T(C): 37.2 (05-13-22 @ 09:27), Max: 37.2 (05-13-22 @ 09:27)  HR: 105 (05-13-22 @ 09:27) (105 - 105)  BP: 135/81 (05-13-22 @ 09:27) (135/81 - 135/81)  RR: 16 (05-13-22 @ 09:27) (16 - 16)  SpO2: 98% (05-13-22 @ 09:27) (98% - 98%)  Wt(kg): --  Daily     Daily     I&O's Summary    PHYSICAL EXAM:  GENERAL: NAD, well-developed  HEAD:  Atraumatic, Normocephalic  EYES: EOMI, PERRLA, conjunctiva and sclera clear, bruises   NECK: Supple, No JVD  CHEST/LUNG: Clear to auscultation bilaterally; No wheeze  HEART: Regular rate and rhythm; No murmurs, rubs, or gallops  ABDOMEN: Soft, Nontender, Nondistended; Bowel sounds present  EXTREMITIES:  2+ Peripheral Pulses, No clubbing, cyanosis, or edema  PSYCH: stuperous  NEUROLOGY: aaox1  SKIN: No rashes or lesions    Labs reviewed  Imaging reviewed independently and reviewed read  < from: CT Abdomen and Pelvis No Cont (05.13.22 @ 10:14) >    IMPRESSION:    No CT evidence of acute intrathoracic or intra-abdominal pathology.    Moderate to severe emphysema.    Enlarged prostate gland contains brachytherapy seeds.    Moderate-sized hiatal hernia.    Fragmentation of the lateral aspect of the left clavicle which could be   posttraumatic. Cannot exclude a lytic lesion. Clinical correlation is   advised.    Mild likely chronic compression fracture of the L1 vertebral body.    EKG reviewed independently and reviewed read  < from: 12 Lead ECG (05.13.22 @ 09:42) >    Diagnosis Line Sinus tachycardia  Left anterior fascicular block  Septal infarct , age undetermined  Inferior infarct , age undetermined  Abnormal ECG    Plan as above dw resident in real time       #Progress Note Handoff  Pending (specify): follow up above work up, echo, ck, lactate, pulm ,ID consult, MRSA and covid swab  Family discussion: house staff updated family   Disposition: SDU

## 2022-05-13 NOTE — H&P ADULT - HISTORY OF PRESENT ILLNESS
89 year old male PMH of HTN, HLD, DM, CAD, status post PC Alzheimer's dementia brought to the ED from the [facility] after being found on the floor of his apartment this morning. Pt was found this morning on the floor by staff minimally responsive and unknown what happened prior to him being found on the floor. Pt was well the day prior. Unable to give story at the time of my assessment as pt appears very confused.     In the ED, pt tachy to 105. Labs notable for wbc 15.84, K 5.1 (hemolyzed), T bili 1.9, lactate 3.4, CK 1332. UA grossly positive. Trauma workup showed L1 chronic compression fracture, right frontal, temporal periorbital and facial swelling; otherwise negative for any acute trauma. 89 year old male PMH of HTN, HLD, DM, CAD status post PCI, Alzheimer's dementia brought to the ED from the [facility] after being found on the floor of his apartment this morning. Pt was found this morning on the floor by staff minimally responsive and unknown what happened prior to him being found on the floor. Pt was well the day prior. Unable to give story at the time of my assessment as pt appears very confused.     In the ED, pt tachy to 105. Labs notable for wbc 15.84, K 5.1 (hemolyzed), T bili 1.9, lactate 3.4, CK 1332. UA grossly positive. Trauma workup showed L1 chronic compression fracture, right frontal, temporal periorbital and facial swelling; otherwise negative for any acute trauma.    Confirmed medrec with pts son Wesly over the phone 89 year old male PMH of HTN, HLD, DM, CAD status post PCI, Alzheimer's dementia brought to the ED from the [facility] after being found on the floor of his apartment this morning. Pt was found this morning on the floor by staff minimally responsive and unknown what happened prior to him being found on the floor. Pt was well the day prior. Unable to give story at the time of my assessment as pt appears very confused.     In the ED, pt tachy to 105. Labs notable for wbc 15.84, K 5.1 (hemolyzed), T bili 1.9, lactate 3.4, CK 1332. UA grossly positive. Trauma workup showed L1 chronic compression fracture, right frontal, temporal periorbital and facial swelling; otherwise negative for any acute trauma.    Confirmed medrec with pts son Wesly over the phone. Also confirmed DNR/DNI status with pts son and filled out updated MOLST form.

## 2022-05-13 NOTE — H&P ADULT - CONVERSATION DETAILS
house staff updated family on pt case. Goals of Care Conversation done with pt family. Team Discussed FULL CODE VS DNR/DNI. CPR and intubation discussed. Family wishes pt to be DNR/DNI.  All questions answered. BLANCO signed and placed in chart

## 2022-05-13 NOTE — ED ADULT NURSE NOTE - OBJECTIVE STATEMENT
bibems s/p fall at NH found down in bathroom unwitnessed fall, + LOC + HT + AC. On ED arrival patient 89% on room air. Unable to answer any questions, lethargic. Afebrile rectally, all other VS stable, pt placed on 3LNC, 02 improved to 98%. Pt with right eye redness and swelling, right deltoid redness, and bl knee redness and swelling. Pt covered in urine , foul smelling, Connected to cardiac monitor with cyclic VS

## 2022-05-13 NOTE — CONSULT NOTE ADULT - ASSESSMENT
IMPRESSION:  Sepsis present on admission  UTI  Rhabdomyolysis  HO DM  HO CAD s/p PCI  HO AD (AOx2 at baseline)    PLAN:    CNS: Avoid CNS depressants.     HEENT: Oral care    PULMONARY:  HOB @ 45 degrees.  Aspiration precautions. Target SpO2 92-96%, titrate oxygen as tolerated.     CARDIOVASCULAR: Avoid volume overload. Hydration with NS at 75 cc/hr. ECHO. 2 sets of CE.     GI: GI prophylaxis.  Feeding as tolerated. Low K diet.     RENAL:  Follow up lytes. Correct as needed. Trend CPK. Repeat LA.     INFECTIOUS DISEASE: Follow up cultures. Vanc and cefepime. Procal. BCx x2.     HEMATOLOGICAL:  DVT prophylaxis.     ENDOCRINE:  Follow up FS. Target -180.     MUSCULOSKELETAL: bedrest    GOC done, family agreeable to DNR/DNI     Step Down Unit monitoring

## 2022-05-13 NOTE — ED PROVIDER NOTE - PHYSICAL EXAMINATION
Constitutional: thin elderly appearing male   TRAUMA: ABC intact. GCS 14. +pericardial effusion and b/l B lines.  Head: Normocephalic, R eye erythema w/ conjunctivitis of R eye  Eyes: No Raccoon eyes.   ENT: No nasal discharge. No septal hematoma. No Magdaleno sign. Mucous membranes moist.  Neck: Supple. Painless ROM. No midline tenderness, stepoffs. in C-collar  Cardiovascular: Regular rate and rhythm.   Pulmonary: Normal respiratory rate and effort. b/l crackles  CHEST: No chest wall tenderness, crepitus.  Abdominal: Soft. Nondistended. Nontender. No rebound, guarding, rigidity.  BACK: No midline T/L/S tenderness, stepoffs. No saddle paresthesia.  Extremities. Pelvis stable. No traumatic deformities, tenderness of extremities.  Neuro: AAO&1. Strength 5/5 in all extremities, contracted. Sensation intact throughout. No focal neurological deficits.

## 2022-05-13 NOTE — CONSULT NOTE ADULT - SUBJECTIVE AND OBJECTIVE BOX
TRAUMA ACTIVATION LEVEL:  Alert    MECHANISM OF INJURY:      [] Blunt  	[] MVC	[x] Fall	[] Pedestrian Struck	[] Motorcycle   [] Assault   [] Bicycle collision  [] Sports injury     [] Penetrating  	[] Gun Shot Wound 		[] Stab Wound    GCS: 	E: 4	V: 5	M: 6    HPI:  89y old m s/p found down at his independent nursing home. Patient is on plavix and his last known well was last night. There are no external signs of trauma however it does appear that he has been down for some time on his right side as his hip, elbow, knee and ankle on the right side all show signs of pressure ulcers. He is opening his eyes on his own and mumbling words but is otherwise not able to follow commands.     PAST MEDICAL & SURGICAL HISTORY:    Allergies  Allergy Status Unknown    Home Medications:    ROS: 10-system review is otherwise negative except HPI above.      Primary Survey:    A - airway intact  B - bilateral breath sounds and good chest rise  C - palpable pulses in all extremities  D - GCS 15 on arrival, DING  Exposure obtained    Secondary Survey:   General: somnolent, GCS 12  HEENT: Normocephalic, erythema and swelling around right eye, no scalp lacerations   Neck: Soft, midline trachea. no cspine tenderness  Chest: No chest wall tenderness. or subq  emphysema   Cardiac: S1, S2, RRR  Respiratory: Bilateral breath sounds, clear and equal bilaterally  Abdomen: Soft, non-distended, non-tender, no rebound,   Groin: Normal appearing, pelvis stable   Ext: palp radial b/l UE, b/l DP palp in Lower Extrem. small ulcers on right sided joints, knee, elbow, hip  Back: no TTP, no palpable runoff/stepoff/deformity    FAST - Negative    RADIOLOGY & ADDITIONAL STUDIES:  PENDING    ---------------------------------------------------------------------------------------     TRAUMA ACTIVATION LEVEL:  Alert    MECHANISM OF INJURY:      [] Blunt  	[] MVC	[x] Fall	[] Pedestrian Struck	[] Motorcycle   [] Assault   [] Bicycle collision  [] Sports injury     [] Penetrating  	[] Gun Shot Wound 		[] Stab Wound    GCS: 	E: 4	V: 5	M: 6    HPI:  89y old m s/p found down at his independent nursing home. Patient is on plavix and his last known well was last night. There are no external signs of trauma however it does appear that he has been down for some time on his right side as his hip, elbow, knee and ankle on the right side all show some erythema. He is opening his eyes on his own and mumbling words but is otherwise not able to follow commands.     PAST MEDICAL & SURGICAL HISTORY:    Allergies  Allergy Status Unknown    Home Medications:    ROS: 10-system review is otherwise negative except HPI above.      Primary Survey:    A - airway intact  B - bilateral breath sounds and good chest rise  C - palpable pulses in all extremities  D - GCS 15 on arrival, DING  Exposure obtained    Secondary Survey:   General: somnolent, GCS 12  HEENT: Normocephalic, erythema and swelling around right eye, no scalp lacerations   Neck: Soft, midline trachea. no cspine tenderness  Chest: No chest wall tenderness. or subq  emphysema   Cardiac: S1, S2, RRR  Respiratory: Bilateral breath sounds, clear and equal bilaterally  Abdomen: Soft, non-distended, non-tender, no rebound,   Groin: Normal appearing, pelvis stable   Ext: palp radial b/l UE, b/l DP palp in Lower Extrem. small ulcers on right sided joints, knee, elbow, hip  Back: no TTP, no palpable runoff/stepoff/deformity    FAST - Negative    RADIOLOGY & ADDITIONAL STUDIES:  PENDING    ---------------------------------------------------------------------------------------

## 2022-05-13 NOTE — CONSULT NOTE ADULT - ASSESSMENT
ASSESSMENT:  89y old m s/p found down on plavix    PLAN:    - pan scan  - trauma labs  - CXR, PXR  -  d/w Dr. Ortiz

## 2022-05-13 NOTE — CONSULT NOTE ADULT - SUBJECTIVE AND OBJECTIVE BOX
Patient is a 89y old  Male who presents with a chief complaint of     HPI:  89 y M PMHx HTN, HLD, DM, CAD, status post PCI, status post CEA, Alzheimer's dementia [facility] after being found on the floor of his apartment this morning; unknown last known well. Patient is unable to give history due to severity of illness.    PAST MEDICAL & SURGICAL HISTORY:      SOCIAL HX:   Smoking      UTO                   ETOH     UTO                       Other UTO    FAMILY HISTORY:  :  No known cardiovascular family history     Review Of Systems:     All ROS are negative except per HPI       Allergies    No Known Allergies    Intolerances          PHYSICAL EXAM    ICU Vital Signs Last 24 Hrs  T(C): 37.2 (13 May 2022 09:27), Max: 37.2 (13 May 2022 09:27)  T(F): 98.9 (13 May 2022 09:27), Max: 98.9 (13 May 2022 09:27)  HR: 105 (13 May 2022 09:27) (105 - 105)  BP: 135/81 (13 May 2022 09:27) (135/81 - 135/81)  RR: 16 (13 May 2022 09:27) (16 - 16)  SpO2: 98% (13 May 2022 09:27) (98% - 98%)      CONSTITUTIONAL:  Elderly, frail, ill-appearing male. NAD. Lethargic.     ENT:   Airway patent,   Mouth with normal mucosa.   No thrush    EYES:   Ecchymosis  pupils equal,   round and reactive to light.    CARDIAC:   Normal rate,   Regular rhythm.    Heart sounds S1, S2.   No edema    RESPIRATORY:   No wheezing   Normal chest expansion  No use of accessory muscles    GASTROINTESTINAL:  Abdomen soft   Non-tender,   No guarding,   + BS    NEUROLOGICAL:   Lethargic   AOx0    SKIN:   Bruises  Skin normal color for race,   Warm and dry  No evidence of rash.    PSYCHIATRIC:   Normal mood and affect.   No apparent risk to self or others.        LABS:                          17.8   15.84 )-----------( 209      ( 13 May 2022 09:30 )             49.2                                               05-    138  |  99  |  26<H>  ----------------------------<  220<H>  5.1<H>   |  21  |  1.1    Ca    9.0      13 May 2022 09:30    TPro  7.1  /  Alb  4.6  /  TBili  1.9<H>  /  DBili  x   /  AST  39  /  ALT  21  /  AlkPhos  83  05-13      PT/INR - ( 13 May 2022 09:30 )   PT: 11.40 sec;   INR: 0.99 ratio         PTT - ( 13 May 2022 09:30 )  PTT:36.0 sec                                       Urinalysis Basic - ( 13 May 2022 09:30 )    Color: Yellow / Appearance: Slightly Turbid / S.017 / pH: x  Gluc: x / Ketone: Moderate  / Bili: Negative / Urobili: <2 mg/dL   Blood: x / Protein: 300 mg/dL / Nitrite: Positive   Leuk Esterase: Large / RBC: 18 /HPF /  /HPF   Sq Epi: x / Non Sq Epi: 0 /HPF / Bacteria: Many      CARDIAC MARKERS ( 13 May 2022 09:30 )  x     / <0.01 ng/mL / 1332 U/L / x     / x                                                LIVER FUNCTIONS - ( 13 May 2022 09:30 )  Alb: 4.6 g/dL / Pro: 7.1 g/dL / ALK PHOS: 83 U/L / ALT: 21 U/L / AST: 39 U/L / GGT: x                                                                                                                            X-Rays reviewed:                                                                                      CXR interpreted by me: no radiographic evidence of acute cardiopulmonary disease     MEDICATIONS  (STANDING):    MEDICATIONS  (PRN):

## 2022-05-13 NOTE — H&P ADULT - NSHPLABSRESULTS_GEN_ALL_CORE
05-13    138  |  99  |  26<H>  ----------------------------<  220<H>  5.1<H>   |  21  |  1.1    Ca    9.0      13 May 2022 09:30    TPro  7.1  /  Alb  4.6  /  TBili  1.9<H>  /  DBili  x   /  AST  39  /  ALT  21  /  AlkPhos  83  05-13                        17.8   15.84 )-----------( 209      ( 13 May 2022 09:30 )             49.2   CTAP:  IMPRESSION:    No CT evidence of acute intrathoracic or intra-abdominal pathology.    Moderate to severe emphysema.    Enlarged prostate gland contains brachytherapy seeds.    Moderate-sized hiatal hernia.    Fragmentation of the lateral aspect of the left clavicle which could be   posttraumatic. Cannot exclude a lytic lesion. Clinical correlation is   advised.    Mild likely chronic compression fracture of the L1 vertebral body.

## 2022-05-13 NOTE — ED PROVIDER NOTE - OBJECTIVE STATEMENT
89 y M PMHx HTN, HLD, DM, CAD, status post PCI, status post CEA, Alzheimer's dementia brought to the ED from the [facility] after being found on the floor of his apartment this morning; unknown last known well. Patient is unable to give history due to severity of illness.

## 2022-05-13 NOTE — ED PROVIDER NOTE - CARE PLAN
Principal Discharge DX:	Acute metabolic encephalopathy  Secondary Diagnosis:	Sepsis  Secondary Diagnosis:	Acute UTI   1

## 2022-05-13 NOTE — ED ADULT NURSE NOTE - NSSUHOSCREENINGYN_ED_ALL_ED
PHYSICAL THERAPY TREATMENT NOTE - INPATIENT    Room Number: 3573/8357-F     Session: 2   Number of Visits to Meet Established Goals: 5    Presenting Problem: L-sided weakness, acute R CVA s/p tPA    History related to current admission: Pt admitted for philip Static Sitting: Fair -  Dynamic Sitting: Fair -           Static Standing: Poor +  Dynamic Standing: Poor    ACTIVITY TOLERANCE  Room air    AM-PAC '6-Clicks' INPATIENT SHORT FORM - BASIC MOBILITY  How much difficulty does the patient currently have. ..  - seated BLE ex's, then stood again to re-attempt ambulation. Pt able to ambulate 5 feet with RW and mod A for balance and weight shifting. Required constant cues for sequencing and to incr step length with each LE. Pt fatigued quickly with mobility.      Kassidy Meza training;Strengthening;Transfer training;Balance training;Stair training  Rehab Potential : Good  Frequency (Obs): 5x/week    CURRENT GOALS      Goal #1 Patient is able to demonstrate supine - sit EOB @ level: supervision      Goal #2 Patient is able to de No - the patient is unable to be screened due to medical condition

## 2022-05-13 NOTE — ED PROVIDER NOTE - ATTENDING CONTRIBUTION TO CARE
I personally evaluated the patient. I reviewed the Resident’s or Physician Assistant’s note (as assigned above), and agree with the findings and plan except as documented in my note.  89-year-old man past medical history significant for hypertension, dyslipidemia, diabetes, CAD, status post PCI, status post CEA, Alzheimer's dementia brought to the ED from the [facility] after he was found on the floor of his apartment this morning.  EMS is unsure when the patient was last seen well.  Patient is unable to give history due to severity of illness.  Vitals noted.   CONSTITUTIONAL: GCS-12. Opens eyes to verbal stimuli. Not following commands or answering questions.   HEAD: Normocephalic; atraumatic.   EYES: PERRL; EOM intact. Conjunctiva normal B/L. R eye with surrounding erythema and edema extending onto R cheek.   ENT: Normal pharynx with no tonsillar hypertrophy. Dry mucous membranes.   NECK: Supple; non-tender; no cervical lymphadenopathy.   CHEST: Normal chest excursion with respiration. Nontender. No crepitus.  CARDIOVASCULAR: Normal S1, S2; no murmurs, rubs, or gallops.   RESPIRATORY: Normal chest excursion with respiration; breath sounds clear and equal bilaterally; no wheezes, rhonchi, or rales.  GI/: Normal bowel sounds; + distended; non-tender.  BACK: No evidence of trauma or deformity. Non-tender to palpation. No CVA tenderness.   EXT: Normal ROM in all four extremities; non-tender to palpation; distal pulses are normal. No leg edema B/L.   SKIN: Stage 1 pressure ulcer to R shoulder, R wrist, R hand, B/L knees and R face.   NEURO: A & O x 4; No facial droop. Moving all extermities.

## 2022-05-13 NOTE — H&P ADULT - ASSESSMENT
89 year old male PMH of HTN, HLD, DM, CAD, status post PC Alzheimer's dementia brought to the ED from the [facility] after being found on the floor of his apartment this morning. Will be admitted to SDU    Sepsis secondary to acute cystitis  Rhabdomyolysis  -found this morning on the floor by staff at [facility]  -unknown what happened prior to being found, pt well the day before  -unable to acquire hx from pt as pt lethargic/obtunded  -meets sepsis criteria as wbc elevated at 15.84, tachy to 105 and grossly positive UA  -critical care following and will be admitted to SDU  -will start pt on vanc 1g q12 and cefepime 1g q24; BCx,UCx  -CK 1332, K 5.1 (hemolyzed); will start NS 75cc/hr for rhabdo  -trend CK; monitor renal function  -will order echo; two sets of trops       89 year old male PMH of HTN, HLD, DM, CAD, status post PC Alzheimer's dementia brought to the ED from the [facility] after being found on the floor of his apartment this morning. Will be admitted to SDU    Sepsis secondary to acute cystitis  Rhabdomyolysis  -found this morning on the floor by staff at [facility]  -unknown what happened prior to being found, pt well the day before  -unable to acquire hx from pt as pt lethargic/obtunded  -meets sepsis criteria as wbc elevated at 15.84, tachy to 105 and grossly positive UA; lactate 3.4  -critical care following and will be admitted to SDU  -will start pt on vanc 1g q12 and cefepime 1g q24; BCx,UCx  -CK 1332, K 5.1 (hemolyzed); will start NS 75cc/hr for rhabdo  -trend CK; monitor renal function  -will order echo; two sets of trops    HTN  HLD  -will continue home toprol 100, amlodipine 5  -hold valsartan 320 for now to avoid hypotension in the setting of sepsis  -continue simvastatin 40    CAD s/p PCI  -continue ASA/plavix    T2DM  -as per pts son, pt hasn't been on meds because glucose well controlled  -will obtain A1C    Alzheimers dementia  -continue donepezil    DVT ppx: lovenox  GI ppx: protonix  Activity: PT consult  Diet: speech and swallow consult  Dispo: SDU, acute         89 year old male PMH of HTN, HLD, DM, CAD, status post PC Alzheimer's dementia brought to the ED from the [facility] after being found on the floor of his apartment this morning. Will be admitted to SDU    Sepsis secondary to acute cystitis  Rhabdomyolysis  -found this morning on the floor by staff at [facility]  -unknown what happened prior to being found, pt well the day before  -unable to acquire hx from pt as pt lethargic/obtunded  -meets sepsis criteria as wbc elevated at 15.84, tachy to 105 and grossly positive UA; lactate 3.4  -critical care following and will be admitted to SDU  -will start pt on vanc 1g q12 and cefepime 1g q24; BCx,UCx  -CK 1332, K 5.1 (hemolyzed); will start NS 75cc/hr for rhabdo  -trend CK; monitor renal function  -will order echo; two sets of trops    HTN  HLD  -will continue home toprol 100, amlodipine 5  -hold valsartan 320 for now to avoid hypotension in the setting of sepsis  -continue simvastatin 40    CAD s/p PCI  -continue ASA/plavix    T2DM  -as per pts son, pt hasn't been on meds because glucose well controlled  -will obtain A1C    Alzheimers dementia  -continue donepezil    DVT ppx: lovenox  GI ppx: protonix  Activity: PT consult  Diet: speech and swallow consult  Dispo: SDU, acute  Code status: DNR/DNI     89 year old male PMH of HTN, HLD, DM, CAD, status post PC Alzheimer's dementia brought to the ED from the [facility] after being found on the floor of his apartment this morning. Will be admitted to SDU    Sepsis secondary to acute cystitis -(WBC, tachy, lactate, source)  Rhabdomyolysis  -found this morning on the floor by staff at [facility]  -unknown what happened prior to being found, pt well the day before  -unable to acquire hx from pt as pt lethargic/obtunded  -meets sepsis criteria as wbc elevated at 15.84, tachy to 105 and grossly positive UA; lactate 3.4  -critical care following and will be admitted to SDU  -start cefepime 1g q24; BCx,UCx  -1 dose vanc, follow up BC and MRSA swab   -CK 1332, K 5.1 (hemolyzed); will start NS 75cc/hr for rhabdo  -trend CK; monitor renal function  -will order echo; two sets of trops  - follow up covid test    HTN  HLD  -will continue home toprol 100, amlodipine 5  -hold valsartan 320 for now to avoid hypotension in the setting of sepsis  -continue simvastatin 40    CAD s/p PCI  -continue ASA/plavix    T2DM  -as per pts son, pt hasn't been on meds because glucose well controlled  -will obtain A1C    Alzheimers dementia  -continue donepezil    DVT ppx: lovenox  GI ppx: protonix  Activity: PT consult  Diet: speech and swallow consult  Dispo: SDU, acute  Code status: DNR/DNI     89 year old male PMH of HTN, HLD, DM, CAD, status post PC Alzheimer's dementia brought to the ED from the [facility] after being found on the floor of his apartment this morning. Will be admitted to SDU    #toxic metabolic encephalopathy 2/2 Sepsis secondary to acute cystitis -(WBC, tachy, lactate, source)  Rhabdomyolysis  -found this morning on the floor by staff at [facility]  -unknown what happened prior to being found, pt well the day before  -unable to acquire hx from pt as pt lethargic/obtunded  -meets sepsis criteria as wbc elevated at 15.84, tachy to 105 and grossly positive UA; lactate 3.4  -critical care following and will be admitted to SDU  -start cefepime 1g q24; BCx,UCx  -1 dose vanc, follow up BC and MRSA swab   -CK 1332, K 5.1 (hemolyzed); will start NS 75cc/hr for rhabdo  -trend CK; monitor renal function  -will order echo; two sets of trops  - follow up covid test    HTN  HLD  -will continue home toprol 100, amlodipine 5  -hold valsartan 320 for now to avoid hypotension in the setting of sepsis  -continue simvastatin 40    CAD s/p PCI  -continue ASA/plavix    T2DM  -as per pts son, pt hasn't been on meds because glucose well controlled  -will obtain A1C    Alzheimers dementia  -continue donepezil    DVT ppx: lovenox  GI ppx: protonix  Activity: PT consult  Diet: speech and swallow consult  Dispo: SDU, acute  Code status: DNR/DNI

## 2022-05-13 NOTE — H&P ADULT - NSICDXPASTMEDICALHX_GEN_ALL_CORE_FT
PAST MEDICAL HISTORY:  Alzheimer disease     Coronary artery disease     Diabetes mellitus     Hyperlipidemia     Hypertension

## 2022-05-14 LAB
ALBUMIN SERPL ELPH-MCNC: 3.7 G/DL — SIGNIFICANT CHANGE UP (ref 3.5–5.2)
ALP SERPL-CCNC: 70 U/L — SIGNIFICANT CHANGE UP (ref 30–115)
ALT FLD-CCNC: 22 U/L — SIGNIFICANT CHANGE UP (ref 0–41)
ANION GAP SERPL CALC-SCNC: 15 MMOL/L — HIGH (ref 7–14)
APPEARANCE UR: ABNORMAL
AST SERPL-CCNC: 54 U/L — HIGH (ref 0–41)
BACTERIA # UR AUTO: NEGATIVE — SIGNIFICANT CHANGE UP
BILIRUB SERPL-MCNC: 2 MG/DL — HIGH (ref 0.2–1.2)
BILIRUB UR-MCNC: NEGATIVE — SIGNIFICANT CHANGE UP
BUN SERPL-MCNC: 25 MG/DL — HIGH (ref 10–20)
CALCIUM SERPL-MCNC: 9 MG/DL — SIGNIFICANT CHANGE UP (ref 8.5–10.1)
CHLORIDE SERPL-SCNC: 102 MMOL/L — SIGNIFICANT CHANGE UP (ref 98–110)
CK SERPL-CCNC: 2180 U/L — HIGH (ref 0–225)
CK SERPL-CCNC: 2290 U/L — HIGH (ref 0–225)
CO2 SERPL-SCNC: 22 MMOL/L — SIGNIFICANT CHANGE UP (ref 17–32)
COLOR SPEC: YELLOW — SIGNIFICANT CHANGE UP
CREAT SERPL-MCNC: 1 MG/DL — SIGNIFICANT CHANGE UP (ref 0.7–1.5)
DIFF PNL FLD: ABNORMAL
EGFR: 70 ML/MIN/1.73M2 — SIGNIFICANT CHANGE UP
EPI CELLS # UR: 0 /HPF — SIGNIFICANT CHANGE UP (ref 0–5)
GLUCOSE BLDC GLUCOMTR-MCNC: 106 MG/DL — HIGH (ref 70–99)
GLUCOSE BLDC GLUCOMTR-MCNC: 114 MG/DL — HIGH (ref 70–99)
GLUCOSE SERPL-MCNC: 126 MG/DL — HIGH (ref 70–99)
GLUCOSE UR QL: NEGATIVE — SIGNIFICANT CHANGE UP
HCT VFR BLD CALC: 46.3 % — SIGNIFICANT CHANGE UP (ref 42–52)
HGB BLD-MCNC: 16.1 G/DL — SIGNIFICANT CHANGE UP (ref 14–18)
HYALINE CASTS # UR AUTO: 8 /LPF — HIGH (ref 0–7)
KETONES UR-MCNC: ABNORMAL
LEUKOCYTE ESTERASE UR-ACNC: ABNORMAL
MAGNESIUM SERPL-MCNC: 2.3 MG/DL — SIGNIFICANT CHANGE UP (ref 1.8–2.4)
MCHC RBC-ENTMCNC: 29.7 PG — SIGNIFICANT CHANGE UP (ref 27–31)
MCHC RBC-ENTMCNC: 34.8 G/DL — SIGNIFICANT CHANGE UP (ref 32–37)
MCV RBC AUTO: 85.4 FL — SIGNIFICANT CHANGE UP (ref 80–94)
NITRITE UR-MCNC: NEGATIVE — SIGNIFICANT CHANGE UP
NRBC # BLD: 0 /100 WBCS — SIGNIFICANT CHANGE UP (ref 0–0)
PH UR: 5.5 — SIGNIFICANT CHANGE UP (ref 5–8)
PLATELET # BLD AUTO: 194 K/UL — SIGNIFICANT CHANGE UP (ref 130–400)
POTASSIUM SERPL-MCNC: 3.9 MMOL/L — SIGNIFICANT CHANGE UP (ref 3.5–5)
POTASSIUM SERPL-SCNC: 3.9 MMOL/L — SIGNIFICANT CHANGE UP (ref 3.5–5)
PROCALCITONIN SERPL-MCNC: 0.1 NG/ML — SIGNIFICANT CHANGE UP (ref 0.02–0.1)
PROT SERPL-MCNC: 5.5 G/DL — LOW (ref 6–8)
PROT UR-MCNC: ABNORMAL
RBC # BLD: 5.42 M/UL — SIGNIFICANT CHANGE UP (ref 4.7–6.1)
RBC # FLD: 13.3 % — SIGNIFICANT CHANGE UP (ref 11.5–14.5)
RBC CASTS # UR COMP ASSIST: 5 /HPF — HIGH (ref 0–4)
SODIUM SERPL-SCNC: 139 MMOL/L — SIGNIFICANT CHANGE UP (ref 135–146)
SP GR SPEC: 1.02 — SIGNIFICANT CHANGE UP (ref 1.01–1.03)
TROPONIN T SERPL-MCNC: 0.03 NG/ML — CRITICAL HIGH
TROPONIN T SERPL-MCNC: 0.03 NG/ML — CRITICAL HIGH
UROBILINOGEN FLD QL: SIGNIFICANT CHANGE UP
WBC # BLD: 11.47 K/UL — HIGH (ref 4.8–10.8)
WBC # FLD AUTO: 11.47 K/UL — HIGH (ref 4.8–10.8)
WBC UR QL: >720 /HPF — HIGH (ref 0–5)

## 2022-05-14 PROCEDURE — 93306 TTE W/DOPPLER COMPLETE: CPT | Mod: 26

## 2022-05-14 PROCEDURE — 73030 X-RAY EXAM OF SHOULDER: CPT | Mod: 26,LT

## 2022-05-14 PROCEDURE — 99232 SBSQ HOSP IP/OBS MODERATE 35: CPT

## 2022-05-14 RX ORDER — DIPHENHYDRAMINE HCL 50 MG
25 CAPSULE ORAL ONCE
Refills: 0 | Status: COMPLETED | OUTPATIENT
Start: 2022-05-14 | End: 2022-05-14

## 2022-05-14 RX ORDER — LABETALOL HCL 100 MG
5 TABLET ORAL ONCE
Refills: 0 | Status: COMPLETED | OUTPATIENT
Start: 2022-05-14 | End: 2022-05-14

## 2022-05-14 RX ORDER — LABETALOL HCL 100 MG
10 TABLET ORAL ONCE
Refills: 0 | Status: COMPLETED | OUTPATIENT
Start: 2022-05-14 | End: 2022-05-14

## 2022-05-14 RX ADMIN — SODIUM CHLORIDE 100 MILLILITER(S): 9 INJECTION INTRAMUSCULAR; INTRAVENOUS; SUBCUTANEOUS at 11:07

## 2022-05-14 RX ADMIN — ENOXAPARIN SODIUM 40 MILLIGRAM(S): 100 INJECTION SUBCUTANEOUS at 09:09

## 2022-05-14 RX ADMIN — Medication 25 MILLIGRAM(S): at 04:40

## 2022-05-14 RX ADMIN — CEFEPIME 100 MILLIGRAM(S): 1 INJECTION, POWDER, FOR SOLUTION INTRAMUSCULAR; INTRAVENOUS at 13:56

## 2022-05-14 RX ADMIN — Medication 250 MILLIGRAM(S): at 17:06

## 2022-05-14 RX ADMIN — Medication 5 MILLIGRAM(S): at 20:08

## 2022-05-14 RX ADMIN — Medication 25 MILLIGRAM(S): at 01:08

## 2022-05-14 RX ADMIN — Medication 10 MILLIGRAM(S): at 05:39

## 2022-05-14 RX ADMIN — Medication 250 MILLIGRAM(S): at 05:55

## 2022-05-14 RX ADMIN — Medication 25 MILLIGRAM(S): at 04:30

## 2022-05-14 NOTE — PHYSICAL THERAPY INITIAL EVALUATION ADULT - SPECIFY REASON(S)
Hold PT as per DEO Del Toro. No act order due to pt is confused, lethargic, and unable to follow command. PT will f/u when appropriate.

## 2022-05-14 NOTE — CONSULT NOTE ADULT - SUBJECTIVE AND OBJECTIVE BOX
Orthopaedic Surgery Consult Note    89yo Male brought to the ED from the [facility] after being found on the floor of his apartment this morning. Pt was found this morning on the floor by staff minimally responsive and unknown what happened prior to him being found on the floor. Pt was well the day prior. Unable to give story at the time of my assessment as pt appears very confused.       PMH/PSH  ACUTE METABOLIC ENCEPHALOPATHY; SEPSIS; ACUTE UTI  Alzheimer disease  HypertensioN  Hyperlipidemia  Diabetes mellitus  Coronary artery disease  Acute metabolic encephalopathy  No significant past surgical history  Sepsis  Acute UTI      Medications  allopurinol 100 milliGRAM(s) Oral two times a day  amLODIPine   Tablet 2.5 milliGRAM(s) Oral daily  cefepime   IVPB      cefepime   IVPB 1000 milliGRAM(s) IV Intermittent every 24 hours  clopidogrel Tablet 75 milliGRAM(s) Oral daily  donepezil 5 milliGRAM(s) Oral at bedtime  enoxaparin Injectable 40 milliGRAM(s) SubCutaneous every 24 hours  metoprolol succinate  milliGRAM(s) Oral daily  pantoprazole    Tablet 40 milliGRAM(s) Oral before breakfast  simvastatin 40 milliGRAM(s) Oral at bedtime  sodium chloride 0.9%. 1000 milliLiter(s) IV Continuous <Continuous>  vancomycin  IVPB      vancomycin  IVPB 1000 milliGRAM(s) IV Intermittent every 12 hours      Home Medications:  allopurinol 100 mg oral tablet: 1 tab(s) orally 2 times a day (13 May 2022 14:41)  amLODIPine 2.5 mg oral tablet: 1 tab(s) orally once a day (13 May 2022 14:42)  donepezil 5 mg oral tablet: 1 tab(s) orally once a day (at bedtime) (13 May 2022 14:40)  glyBURIDE 1.25 mg oral tablet: 1 tab(s) orally once a day (13 May 2022 14:41)  metoprolol succinate 100 mg oral tablet, extended release: 1 tab(s) orally once a day (13 May 2022 14:41)  Plavix 75 mg oral tablet: 1 tab(s) orally once a day (13 May 2022 14:41)  simvastatin 40 mg oral tablet: 1 tab(s) orally once a day (at bedtime) (13 May 2022 14:41)  valsartan 320 mg oral tablet: 1 tab(s) orally once a day (13 May 2022 14:42)        Allergies  No Known Allergies        T(C): 35.8 (05-14-22 @ 07:00), Max: 37.6 (05-13-22 @ 18:30)  HR: 58 (05-14-22 @ 08:00) (56 - 148)  BP: 139/63 (05-14-22 @ 08:00) (130/69 - 196/84)  RR: 22 (05-14-22 @ 08:00) (16 - 48)  SpO2: 97% (05-14-22 @ 08:00) (92% - 99%)    P/E:  Awake, non-responsive to commands/pain withdrawal   Non-labored breathing    LUE  Skin intact, no skin tenting/blanching  No swelling/erythema/ecchymosis noted  No gross deformity/laceration/abrasion noted  No withdrawal/grimacing with TTP left distal clavicle  Compartments soft and compressible, no pain w/ passive stretch of digits  SILT Ax/LABCN/R/M/U  AIN/PIN/U intact  Firing deltoid/biceps/triceps/wf/we/epl/fpl/fdp/io   Radial pulse 2+, cap refill <2        Labs                        16.1   11.47 )-----------( 194      ( 14 May 2022 05:53 )             46.3     05-14    139  |  102  |  25<H>  ----------------------------<  126<H>  3.9   |  22  |  1.0    Ca    9.0      14 May 2022 05:53  Mg     2.3     05-14    TPro  5.5<L>  /  Alb  3.7  /  TBili  2.0<H>  /  DBili  x   /  AST  54<H>  /  ALT  22  /  AlkPhos  70  05-14    LIVER FUNCTIONS - ( 14 May 2022 05:53 )  Alb: 3.7 g/dL / Pro: 5.5 g/dL / ALK PHOS: 70 U/L / ALT: 22 U/L / AST: 54 U/L / GGT: x           PT/INR - ( 13 May 2022 09:30 )   PT: 11.40 sec;   INR: 0.99 ratio         PTT - ( 13 May 2022 09:30 )  PTT:36.0 sec    Imaging:  CT Chest demonstrates left distal clavicle fx.    A/P:  89yo Male presents with left distal clavicle fx s/p being found on the floor of his apartment - unwitnessed with patient unable to provide detail of injury  No acute orthopedic intervention at this time  Weight bearing: NWB LURAFAEL  Pain control  Placement of sling  AM labs  VTE prophylaxis  Rest of care per primary team  Follow-up w/ Dr. West. please call [phone] to schedule an appointment;   Return to ED with uncontrolled pain/bleeding/fever/chills/numbness/tingling/cool extremity/inability to move extremity

## 2022-05-14 NOTE — PROGRESS NOTE ADULT - SUBJECTIVE AND OBJECTIVE BOX
RENETTAAC  89y, Male  Allergy: No Known Allergies    Hospital Day: 1d    Patient seen and examined earlier today. admitted with AMS. elevated CK, speech saw the patient and recommended NPO for now.   Patient unable to follow commands     PMH/PSH:  PAST MEDICAL & SURGICAL HISTORY:  Alzheimer disease      Hypertension      Hyperlipidemia      Diabetes mellitus      Coronary artery disease      No significant past surgical history          LAST 24-Hr EVENTS:    VITALS:  T(F): 96.5 (22 @ 07:00), Max: 99.7 (22 @ 18:30)  HR: 75 (22 @ 11:00)  BP: 134/57 (22 @ 11:00) (130/69 - 196/84)  RR: 22 (22 @ 11:00)  SpO2: 96% (22 @ 11:00)          TESTS & MEASUREMENTS:  Weight/BMI  70.3 (22 @ 10:27)    22 @ 07:01  -  22 @ 07:00  --------------------------------------------------------  IN: 1125 mL / OUT: 430 mL / NET: 695 mL    22 @ 07:01  -  22 @ 12:13  --------------------------------------------------------  IN: 400 mL / OUT: 0 mL / NET: 400 mL                            16.1   11.47 )-----------( 194      ( 14 May 2022 05:53 )             46.3     PT/INR - ( 13 May 2022 09:30 )   PT: 11.40 sec;   INR: 0.99 ratio         PTT - ( 13 May 2022 09:30 )  PTT:36.0 sec  INR: 0.99 ratio (22 @ 09:30)        139  |  102  |  25<H>  ----------------------------<  126<H>  3.9   |  22  |  1.0    Ca    9.0      14 May 2022 05:53  Mg     2.3         TPro  5.5<L>  /  Alb  3.7  /  TBili  2.0<H>  /  DBili  x   /  AST  54<H>  /  ALT  22  /  AlkPhos  70      LIVER FUNCTIONS - ( 14 May 2022 05:53 )  Alb: 3.7 g/dL / Pro: 5.5 g/dL / ALK PHOS: 70 U/L / ALT: 22 U/L / AST: 54 U/L / GGT: x           CARDIAC MARKERS ( 14 May 2022 05:53 )  x     / 0.03 ng/mL / 2290 U/L / x     / x      CARDIAC MARKERS ( 14 May 2022 01:24 )  x     / x     / 2180 U/L / x     / x      CARDIAC MARKERS ( 13 May 2022 20:45 )  x     / 0.02 ng/mL / 1999 U/L / x     / x      CARDIAC MARKERS ( 13 May 2022 09:30 )  x     / <0.01 ng/mL / 1332 U/L / x     / x            Urinalysis Basic - ( 13 May 2022 09:30 )    Color: Yellow / Appearance: Slightly Turbid / S.017 / pH: x  Gluc: x / Ketone: Moderate  / Bili: Negative / Urobili: <2 mg/dL   Blood: x / Protein: 300 mg/dL / Nitrite: Positive   Leuk Esterase: Large / RBC: 18 /HPF /  /HPF   Sq Epi: x / Non Sq Epi: 0 /HPF / Bacteria: Many      Procalcitonin, Serum: 0.10 ng/mL (22 @ 16:43)          COVID-19 PCR: NotDetec (22 @ 09:30)        A1C with Estimated Average Glucose Result: 7.3 % (22 @ 16:43)      Indwelling Urethral Catheter:     Connect To:  Straight Drainage/Gravity    Indication:  Urine Output Monitoring in Critically Ill (22 @ 02:32) (not performed)      RADIOLOGY, ECG, & ADDITIONAL TESTS:  12 Lead ECG:   Ventricular Rate 102 BPM    Atrial Rate 102 BPM    P-R Interval 172 ms    QRS Duration 82 ms    Q-T Interval 374 ms    QTC Calculation(Bazett) 487 ms    P Axis 59 degrees    R Axis -57 degrees    T Axis 78 degrees    Diagnosis Line Sinus tachycardia  Left anterior fascicular block  Septal infarct , age undetermined  Inferior infarct , age undetermined  Abnormal ECG    Confirmed by JIAN ARBOLEDA MD ([id number]) on 2022 10:04:59 AM (22 @ 09:42)    CT Abdomen and Pelvis No Cont:   ACC: [id number] EXAM:  CT ABDOMEN AND PELVIS                        ACC: [id number] EXAM:  CT CHEST                          PROCEDURE DATE:  2022          INTERPRETATION:  CLINICAL HISTORY / REASON FOR EXAM: Trauma to chest,   abdomen and pelvis    TECHNIQUE: Contiguous axial CT images were obtained from the lower chest   to the pubic symphysis without intravenous contrast. Oral contrast was   not administered. Coronal, sagittal and 3D/MIP reformatted images are   also submitted.    COMPARISON CT: None.    OTHER STUDIES USED FOR CORRELATION: None.      FINDINGS: Limited evaluation without IV contrast.    CHEST:    TUBES/LINES: None.    LUNGS, PLEURA, AND AIRWAYS: Moderate to severe diffuse centrilobular   emphysema with an upper lobe predominance. Bibasilar   scarring/atelectasis. No pneumothorax. No consolidation or pleural   effusion. Central airways patent.    MEDIASTINUM/THORACIC NODES: No mediastinal or axillary lymphadenopathy.   Heterogeneous mildly enlarged right thyroid gland.    HEART/GREAT VESSELS: No pericardial effusion. Cardiomegaly. Coronary   artery and thoracic aorta atherosclerotic calcifications. No aneurysmal   dilation of the thoracic aorta.      ABDOMEN/PELVIS:    HEPATOBILIARY: Unremarkable.    SPLEEN: Unremarkable.    PANCREAS: Unremarkable.    ADRENAL GLANDS: Unremarkable.    KIDNEYS: Unremarkable.    ABDOMINOPELVIC NODES: Unremarkable.    PELVIC ORGANS: Enlarged prostate gland contains brachytherapy seeds. The   bladder is decompressed by Lagunas catheter.    PERITONEUM/MESENTERY/BOWEL: Moderate size hiatal hernia. No bowel   obstruction, ascites or intraperitoneal free air. Normal caliber   appendix. Colonic diverticulosis.    BONES/SOFT TISSUES: No acute osseous abnormality. Fragmentation of the   lateral aspect of the left clavicle which could be posttraumatic. Cannot   exclude a lytic lesion. Clinical correlation is advised. Multilevel   degenerative changes of the thoracolumbar spine.    Transitional vertebrae with sacralization of L5. Mild likely chronic   compression fracture of the L1 vertebral body.    VASCULAR: Calcified atherosclerotic disease of the abdominal aorta.      IMPRESSION:    No CT evidence of acute intrathoracic or intra-abdominal pathology.    Moderate to severe emphysema.    Enlarged prostate gland contains brachytherapy seeds.    Moderate-sized hiatal hernia.    Fragmentation of the lateral aspect of the left clavicle which could be   posttraumatic. Cannot exclude a lytic lesion. Clinical correlation is   advised.    Mild likely chronic compression fracture of the L1 vertebral body.    --- End of Report ---            FOREST GARDNER MD; Attending Radiologist  This document has been electronically signed. May 13 2022 10:33AM (22 @ 10:14)  CT Head No Cont:   ACC: [id number] EXAM:  CT BRAIN                          PROCEDURE DATE:  2022          INTERPRETATION:  Clinical History / Reason for exam: Trauma code    Technique: Noncontrast head CT.  Contiguous unenhanced CT axial images of   the head from the base to the vertex with coronal and sagittal reformats.    Comparison: None available    Findings:    The study is motion limited.    The ventricles and cortical sulci are compatible with a moderate degree   of parenchymal volume loss.    There are patchy and confluent hypodensities throughout the hemispheric   white matter without mass effect compatible with chronic microvascular   changes.    There are lacunar infarcts within bilateral deep gray nuclei, age   indeterminate.    There is no acute intracranial hemorrhage, extra-axial fluid collection   or midline shift.  Gray white matter differentiation is maintained.    There is a hyperdense partially calcified dural based lesion arising from   the right temporal convexity measuring 1.5cm compatible with a   meningioma. There is no mass effect or perilesional edema.    Vascular calcifications are noted.    There is right frontal, temporal and periorbital soft tissue swelling.    Maxillofacial CT is dictated separately.    IMPRESSION:    1.  No evidence of acute intracranial hemorrhage.    2.  Moderate/severe chronic microvascular changes and parenchymal   atrophy. 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   Incidental right temporal convexity meningioma measuring 1.5 cm.    3.  Right frontal, temporal and periorbital extracalvarial soft tissue   swelling.    --- End of Report ---            MIKE GOLDSTEIN MD; Attending Radiologist  This document has been electronically signed. May 13 2022 10:32AM (22 @ 10:13)    RECENT DIAGNOSTIC ORDERS:  Basic Metabolic Panel: AM Sched. Collection: 15-May-2022 04:30 (22 @ 12:12)  Complete Blood Count: AM Sched. Collection: 15-May-2022 04:30 (22 @ 12:12)  Creatine Kinase, Serum: AM Sched. Collection: 15-May-2022 04:30 (22 @ 12:12)  Xray Shoulder 2 Views, Left: Urgent   Indication: No Predefined Suggestions  Transport: Stretcher-Crib,  w/ Monitor,  w/ IV Pole  Exam Completed (22 @ 09:31)  Lactate, Blood: STAT  Addl Info: Draw repeat Lactate, STAT (22 @ 00:17)  Diet, NPO:   Except Medications (22 @ 16:41)  MRSA/MSSA PCR: 18:00 (22 @ 15:56)  TTE Echo Complete w/o Contrast w/ Doppler:   Transport: Stretcher-Crib  Monitor: w/o Monitor (22 @ 15:02)  Culture - Urine: 16:00  Specimen Source: Catheterized  Addl Info: If indwelling urinary catheter > 14 days, obtain an order to remove and replace prior to c (22 @ 15:02)  Culture - Blood: 16:00  Specimen Source: Blood-Peripheral (22 @ 15:02)      MEDICATIONS:  MEDICATIONS  (STANDING):  allopurinol 100 milliGRAM(s) Oral two times a day  amLODIPine   Tablet 2.5 milliGRAM(s) Oral daily  cefepime   IVPB      cefepime   IVPB 1000 milliGRAM(s) IV Intermittent every 24 hours  clopidogrel Tablet 75 milliGRAM(s) Oral daily  donepezil 5 milliGRAM(s) Oral at bedtime  enoxaparin Injectable 40 milliGRAM(s) SubCutaneous every 24 hours  metoprolol succinate  milliGRAM(s) Oral daily  pantoprazole    Tablet 40 milliGRAM(s) Oral before breakfast  simvastatin 40 milliGRAM(s) Oral at bedtime  sodium chloride 0.9%. 1000 milliLiter(s) (100 mL/Hr) IV Continuous <Continuous>  vancomycin  IVPB      vancomycin  IVPB 1000 milliGRAM(s) IV Intermittent every 12 hours    MEDICATIONS  (PRN):      HOME MEDICATIONS:  allopurinol 100 mg oral tablet ()  amLODIPine 2.5 mg oral tablet ()  donepezil 5 mg oral tablet ()  glyBURIDE 1.25 mg oral tablet ()  metoprolol succinate 100 mg oral tablet, extended release ()  Plavix 75 mg oral tablet ()  simvastatin 40 mg oral tablet ()  valsartan 320 mg oral tablet (05-13)      PHYSICAL EXAM:  GENERAL: Patient lying on bed, unable to follow commands   CHEST/LUNG: NVB, no wheezing   HEART: R1+R2, RRR  ABDOMEN: Soft. non tender, BS positive  EXTREMITIES:  no edema, Bruising

## 2022-05-14 NOTE — CHART NOTE - NSCHARTNOTEFT_GEN_A_CORE
Tertiary Trauma Survey (TTS)    Date of TTS: 22 @ 12:10   Admit Date: 22  Trauma Activation: CODE / ALERT / CONSULT  Admit GCS:        E-     V-     M-     HPI:  89 year old male PMH of HTN, HLD, DM, CAD status post PCI, Alzheimer's dementia brought to the ED from the [facility] after being found on the floor of his apartment this morning. Pt was found this morning on the floor by staff minimally responsive and unknown what happened prior to him being found on the floor. Pt was well the day prior. Unable to give story at the time of my assessment as pt appears very confused.     In the ED, pt tachy to 105. Labs notable for wbc 15.84, K 5.1 (hemolyzed), T bili 1.9, lactate 3.4, CK 1332. UA grossly positive. Trauma workup showed L1 chronic compression fracture, right frontal, temporal periorbital and facial swelling; otherwise negative for any acute trauma.    Confirmed medrec with pts son Wesly over the phone. Also confirmed DNR/DNI status with pts son and filled out updated MOLST form. (13 May 2022 13:56)    22  Patient seen and examined this AM with Trauma Attending for tertiary survey.  No evidence of traumatic injuries or new complaints/symptoms.    PHYSICAL EXAM:  General: Baseline dementia  HEENT: Normocephalic, atraumatic, EOMI, PEERLA. no scalp lacerations   Chest: No chest wall tenderness, no subcutaneous emphysema.  No left shoulder tenderness.  Cardiac: Sinus tachy  Respiratory: Bilateral breath sounds, clear and equal bilaterally  Abdomen: Soft, non-distended, non-tender, no rebound, no guarding.  Groin: Normal appearing, pelvis stable   Ext:  Moving b/l upper and lower extremities. Palpable Radial b/l UE, b/l DP palpable in LE. FROM in LUE.  Back: No T/L/S spine tenderness, No palpable runoff/stepoff/deformity      Vital Signs Last 24 Hrs  T(C): 35.8 (14 May 2022 07:00), Max: 37.6 (13 May 2022 18:30)  T(F): 96.5 (14 May 2022 07:00), Max: 99.7 (13 May 2022 18:30)  HR: 75 (14 May 2022 11:00) (56 - 148)  BP: 134/57 (14 May 2022 11:00) (130/69 - 196/84)  BP(mean): 82 (14 May 2022 11:00) (82 - 120)  RR: 22 (14 May 2022 11:00) (16 - 48)  SpO2: 96% (14 May 2022 11:00) (92% - 99%)    Labs:  CAPILLARY BLOOD GLUCOSE      POCT Blood Glucose.: 114 mg/dL (14 May 2022 05:52)                          16.1   11.47 )-----------( 194      ( 14 May 2022 05:53 )             46.3       Auto Neutrophil %: 83.8 % (22 @ 20:45)  Auto Immature Granulocyte %: 0.6 % (22 @ 20:45)        139  |  102  |  25<H>  ----------------------------<  126<H>  3.9   |  22  |  1.0      Calcium, Total Serum: 9.0 mg/dL (22 @ 05:53)      LFTs:             5.5  | 2.0  | 54       ------------------[70      ( 14 May 2022 05:53 )  3.7  | x    | 22          Lipase:x      Amylase:x         Lactate, Blood: 4.1 mmol/L (22 @ 20:45)  Blood Gas Venous - Lactate: 3.10 mmol/L (22 @ 12:07)  Lactate, Blood: 3.4 mmol/L (22 @ 09:30)      Coags:     11.40  ----< 0.99    ( 13 May 2022 09:30 )     36.0        CARDIAC MARKERS ( 14 May 2022 05:53 )  x     / 0.03 ng/mL / 2290 U/L / x     / x      CARDIAC MARKERS ( 14 May 2022 01:24 )  x     / x     / 2180 U/L / x     / x      CARDIAC MARKERS ( 13 May 2022 20:45 )  x     / 0.02 ng/mL / 1999 U/L / x     / x      CARDIAC MARKERS ( 13 May 2022 09:30 )  x     / <0.01 ng/mL / 1332 U/L / x     / x          Urinalysis Basic - ( 13 May 2022 09:30 )    Color: Yellow / Appearance: Slightly Turbid / S.017 / pH: x  Gluc: x / Ketone: Moderate  / Bili: Negative / Urobili: <2 mg/dL   Blood: x / Protein: 300 mg/dL / Nitrite: Positive   Leuk Esterase: Large / RBC: 18 /HPF /  /HPF   Sq Epi: x / Non Sq Epi: 0 /HPF / Bacteria: Many      RADIOLOGICAL FINDINGS REVIEW:  All imaging reviewed  L shoulder XR done, read pending, will followup    ASSESSMENT/ PLAN:   89 y/omale, S/P Fall.  Possible syncope.  Closed head injury.  AMS.  UTI present on admission.  Frail elderly.    Seen for trauma TTS, left shoulder dedicated Xrays ordered and done for ?chronic L clavicle fracture, seen by orthopedic team and rec FU outpatient, arm sling  - All images/reports reviewed. No further traumatic work-up warranted. Tertiary Trauma Survey (TTS)    Date of TTS: 22 @ 12:10   Admit Date: 22  Trauma Activation: CODE / ALERT / CONSULT  Admit GCS:        E-     V-     M-     HPI:  89 year old male PMH of HTN, HLD, DM, CAD status post PCI, Alzheimer's dementia brought to the ED from the [facility] after being found on the floor of his apartment this morning. Pt was found this morning on the floor by staff minimally responsive and unknown what happened prior to him being found on the floor. Pt was well the day prior. Unable to give story at the time of my assessment as pt appears very confused.     In the ED, pt tachy to 105. Labs notable for wbc 15.84, K 5.1 (hemolyzed), T bili 1.9, lactate 3.4, CK 1332. UA grossly positive. Trauma workup showed L1 chronic compression fracture, right frontal, temporal periorbital and facial swelling; otherwise negative for any acute trauma.    Confirmed medrec with pts son Wesly over the phone. Also confirmed DNR/DNI status with pts son and filled out updated MOLST form. (13 May 2022 13:56)    22  Patient seen and examined this AM with Trauma Attending for tertiary survey.  No evidence of traumatic injuries or new complaints/symptoms.    PHYSICAL EXAM:  General: Baseline dementia  HEENT: Normocephalic, atraumatic, EOMI, PEERLA. no scalp lacerations   Chest: No chest wall tenderness, no subcutaneous emphysema.  No left shoulder tenderness.  Cardiac: Sinus tachy  Respiratory: Bilateral breath sounds, clear and equal bilaterally  Abdomen: Soft, non-distended, non-tender, no rebound, no guarding.  Groin: Normal appearing, pelvis stable   Ext:  Moving b/l upper and lower extremities. Palpable Radial b/l UE, b/l DP palpable in LE. FROM in LUE.  Back: No T/L/S spine tenderness, No palpable runoff/stepoff/deformity      Vital Signs Last 24 Hrs  T(C): 35.8 (14 May 2022 07:00), Max: 37.6 (13 May 2022 18:30)  T(F): 96.5 (14 May 2022 07:00), Max: 99.7 (13 May 2022 18:30)  HR: 75 (14 May 2022 11:00) (56 - 148)  BP: 134/57 (14 May 2022 11:00) (130/69 - 196/84)  BP(mean): 82 (14 May 2022 11:00) (82 - 120)  RR: 22 (14 May 2022 11:00) (16 - 48)  SpO2: 96% (14 May 2022 11:00) (92% - 99%)    Labs:  CAPILLARY BLOOD GLUCOSE      POCT Blood Glucose.: 114 mg/dL (14 May 2022 05:52)                          16.1   11.47 )-----------( 194      ( 14 May 2022 05:53 )             46.3       Auto Neutrophil %: 83.8 % (22 @ 20:45)  Auto Immature Granulocyte %: 0.6 % (22 @ 20:45)        139  |  102  |  25<H>  ----------------------------<  126<H>  3.9   |  22  |  1.0      Calcium, Total Serum: 9.0 mg/dL (22 @ 05:53)      LFTs:             5.5  | 2.0  | 54       ------------------[70      ( 14 May 2022 05:53 )  3.7  | x    | 22          Lipase:x      Amylase:x         Lactate, Blood: 4.1 mmol/L (22 @ 20:45)  Blood Gas Venous - Lactate: 3.10 mmol/L (22 @ 12:07)  Lactate, Blood: 3.4 mmol/L (22 @ 09:30)      Coags:     11.40  ----< 0.99    ( 13 May 2022 09:30 )     36.0        CARDIAC MARKERS ( 14 May 2022 05:53 )  x     / 0.03 ng/mL / 2290 U/L / x     / x      CARDIAC MARKERS ( 14 May 2022 01:24 )  x     / x     / 2180 U/L / x     / x      CARDIAC MARKERS ( 13 May 2022 20:45 )  x     / 0.02 ng/mL / 1999 U/L / x     / x      CARDIAC MARKERS ( 13 May 2022 09:30 )  x     / <0.01 ng/mL / 1332 U/L / x     / x          Urinalysis Basic - ( 13 May 2022 09:30 )    Color: Yellow / Appearance: Slightly Turbid / S.017 / pH: x  Gluc: x / Ketone: Moderate  / Bili: Negative / Urobili: <2 mg/dL   Blood: x / Protein: 300 mg/dL / Nitrite: Positive   Leuk Esterase: Large / RBC: 18 /HPF /  /HPF   Sq Epi: x / Non Sq Epi: 0 /HPF / Bacteria: Many      RADIOLOGICAL FINDINGS REVIEW:  impression: Diffuse osteopenia. No definite acute displaced fracture or   dislocation. Ossifications at the region of the coracoclavicular ligament   consistent with sequela of remote ligamentous injury.    Moderate degenerative change of the glenohumeral and AC joints. Partially   visualized fibrotic changes of the lungs.    Rest of imaging reviewed and no new injuries identified      ASSESSMENT/ PLAN:   89 y/omale, S/P Fall.  Possible syncope.  Closed head injury.  AMS.  UTI present on admission.  Frail elderly.    Seen for trauma TTS, left shoulder dedicated Xrays performed, seen by orthopedic team and rec FU outpatient, arm sling  - All images/reports reviewed. No further traumatic work-up warranted.

## 2022-05-14 NOTE — PROGRESS NOTE ADULT - ASSESSMENT
· Assessment	  89 year old male PMH of HTN, HLD, DM, CAD, status post PC Alzheimer's dementia brought to the ED from the [facility] after being found on the floor of his apartment this morning. WAdmitted to SDU    #toxic metabolic encephalopathy 2/2 Sepsis secondary to acute cystitis -(WBC, tachy, lactate, source)  Rhabdomyolysis  -found this morning on the floor by staff at [facility]  -unknown what happened prior to being found, pt well the day before  -unable to acquire hx from pt as pt lethargic/obtunded  -meets sepsis criteria as wbc elevated at 15.84, tachy to 105 and grossly positive UA; lactate 3.4  -critical care following and will be admitted to SDU  -start cefepime 1g q24; BCx,UCx  -1 dose vanc, follow up BC and MRSA swab   -CK 1332, K 5.1 (hemolyzed); on NS 100c/hr for rhabdo  -trend CK; monitor renal function, CK in AM  -echo ordered, tropo 0.02 and 0.03. will repeat trop one more time , follow troponin   follow UA, urine culture blood culture   continue abx.    HTN  HLD  -will continue home toprol 100, amlodipine 5  -hold valsartan 320 for now to avoid hypotension in the setting of sepsis  -continue simvastatin 40    CAD s/p PCI  -continue ASA/plavix    T2DM  -as per pts son, pt hasn't been on meds because glucose well controlled  -follow A1C    Alzheimers dementia  -continue donepezil    #left clavicular fracture  orthopedics following  no acute intervention at this time      DVT ppx: lovenox  GI ppx: protonix  Activity: PT consult  Diet: speech and swallow consult  Dispo: SDU, acute  Code status: DNR/DNI      Manish Dallas MD  Hospitalist    · Assessment	  89 year old male PMH of HTN, HLD, DM, CAD, status post PC Alzheimer's dementia brought to the ED from the [facility] after being found on the floor of his apartment this morning. WAdmitted to SDU    #toxic metabolic encephalopathy 2/2 Sepsis secondary to acute cystitis -(WBC, tachy, lactate, source)  Rhabdomyolysis  -found this morning on the floor by staff at [facility]  -unknown what happened prior to being found, pt well the day before  -unable to acquire hx from pt as pt lethargic/obtunded  -meets sepsis criteria as wbc elevated at 15.84, tachy to 105 and grossly positive UA; lactate 3.4  -critical care following and will be admitted to SDU  -start cefepime 1g q24; BCx,UCx  -1 dose vanc, follow up BC and MRSA swab   -CK 1332, K 5.1 (hemolyzed); on NS 100c/hr for rhabdo  -trend CK; monitor renal function, CK in AM  -echo ordered, tropo 0.02 and 0.03. will repeat trop one more time , follow troponin   follow UA, urine culture blood culture ID consult  continue abx.    HTN  HLD  -will continue home toprol 100, amlodipine 5  -hold valsartan 320 for now to avoid hypotension in the setting of sepsis  -continue simvastatin 40    CAD s/p PCI  -continue ASA/plavix    T2DM  -as per pts son, pt hasn't been on meds because glucose well controlled  -follow A1C    Alzheimers dementia  -continue donepezil    #left clavicular fracture  orthopedics following  no acute intervention at this time      DVT ppx: lovenox  GI ppx: protonix  Activity: PT consult  Diet: speech and swallow consult  Dispo: SDU, acute  Code status: DNR/DNI      Manish Dallas MD  Hospitalist

## 2022-05-15 LAB
ANION GAP SERPL CALC-SCNC: 17 MMOL/L — HIGH (ref 7–14)
ANION GAP SERPL CALC-SCNC: 18 MMOL/L — HIGH (ref 7–14)
BUN SERPL-MCNC: 18 MG/DL — SIGNIFICANT CHANGE UP (ref 10–20)
BUN SERPL-MCNC: 20 MG/DL — SIGNIFICANT CHANGE UP (ref 10–20)
CALCIUM SERPL-MCNC: 7.8 MG/DL — LOW (ref 8.5–10.1)
CALCIUM SERPL-MCNC: 8.4 MG/DL — LOW (ref 8.5–10.1)
CHLORIDE SERPL-SCNC: 102 MMOL/L — SIGNIFICANT CHANGE UP (ref 98–110)
CHLORIDE SERPL-SCNC: 105 MMOL/L — SIGNIFICANT CHANGE UP (ref 98–110)
CK SERPL-CCNC: 640 U/L — HIGH (ref 0–225)
CO2 SERPL-SCNC: 18 MMOL/L — SIGNIFICANT CHANGE UP (ref 17–32)
CO2 SERPL-SCNC: 19 MMOL/L — SIGNIFICANT CHANGE UP (ref 17–32)
CREAT SERPL-MCNC: 0.9 MG/DL — SIGNIFICANT CHANGE UP (ref 0.7–1.5)
CREAT SERPL-MCNC: 0.9 MG/DL — SIGNIFICANT CHANGE UP (ref 0.7–1.5)
EGFR: 79 ML/MIN/1.73M2 — SIGNIFICANT CHANGE UP
EGFR: 79 ML/MIN/1.73M2 — SIGNIFICANT CHANGE UP
GLUCOSE SERPL-MCNC: 109 MG/DL — HIGH (ref 70–99)
GLUCOSE SERPL-MCNC: 110 MG/DL — HIGH (ref 70–99)
HCT VFR BLD CALC: 42.6 % — SIGNIFICANT CHANGE UP (ref 42–52)
HGB BLD-MCNC: 14.8 G/DL — SIGNIFICANT CHANGE UP (ref 14–18)
LDH SERPL L TO P-CCNC: 292 U/L — HIGH (ref 50–242)
MCHC RBC-ENTMCNC: 29.7 PG — SIGNIFICANT CHANGE UP (ref 27–31)
MCHC RBC-ENTMCNC: 34.7 G/DL — SIGNIFICANT CHANGE UP (ref 32–37)
MCV RBC AUTO: 85.4 FL — SIGNIFICANT CHANGE UP (ref 80–94)
NRBC # BLD: 0 /100 WBCS — SIGNIFICANT CHANGE UP (ref 0–0)
PLATELET # BLD AUTO: 182 K/UL — SIGNIFICANT CHANGE UP (ref 130–400)
POTASSIUM SERPL-MCNC: 3.2 MMOL/L — LOW (ref 3.5–5)
POTASSIUM SERPL-MCNC: 3.4 MMOL/L — LOW (ref 3.5–5)
POTASSIUM SERPL-SCNC: 3.2 MMOL/L — LOW (ref 3.5–5)
POTASSIUM SERPL-SCNC: 3.4 MMOL/L — LOW (ref 3.5–5)
RBC # BLD: 4.99 M/UL — SIGNIFICANT CHANGE UP (ref 4.7–6.1)
RBC # FLD: 13.3 % — SIGNIFICANT CHANGE UP (ref 11.5–14.5)
SODIUM SERPL-SCNC: 138 MMOL/L — SIGNIFICANT CHANGE UP (ref 135–146)
SODIUM SERPL-SCNC: 141 MMOL/L — SIGNIFICANT CHANGE UP (ref 135–146)
WBC # BLD: 10.2 K/UL — SIGNIFICANT CHANGE UP (ref 4.8–10.8)
WBC # FLD AUTO: 10.2 K/UL — SIGNIFICANT CHANGE UP (ref 4.8–10.8)

## 2022-05-15 PROCEDURE — 99233 SBSQ HOSP IP/OBS HIGH 50: CPT

## 2022-05-15 PROCEDURE — 99232 SBSQ HOSP IP/OBS MODERATE 35: CPT

## 2022-05-15 PROCEDURE — 71045 X-RAY EXAM CHEST 1 VIEW: CPT | Mod: 26

## 2022-05-15 RX ORDER — CEFEPIME 1 G/1
1000 INJECTION, POWDER, FOR SOLUTION INTRAMUSCULAR; INTRAVENOUS EVERY 12 HOURS
Refills: 0 | Status: DISCONTINUED | OUTPATIENT
Start: 2022-05-15 | End: 2022-05-17

## 2022-05-15 RX ORDER — POTASSIUM CHLORIDE 20 MEQ
10 PACKET (EA) ORAL
Refills: 0 | Status: DISCONTINUED | OUTPATIENT
Start: 2022-05-15 | End: 2022-05-15

## 2022-05-15 RX ORDER — POTASSIUM CHLORIDE 20 MEQ
20 PACKET (EA) ORAL ONCE
Refills: 0 | Status: COMPLETED | OUTPATIENT
Start: 2022-05-15 | End: 2022-05-15

## 2022-05-15 RX ORDER — HYDRALAZINE HCL 50 MG
10 TABLET ORAL EVERY 6 HOURS
Refills: 0 | Status: DISCONTINUED | OUTPATIENT
Start: 2022-05-15 | End: 2022-05-16

## 2022-05-15 RX ORDER — HYDRALAZINE HCL 50 MG
5 TABLET ORAL ONCE
Refills: 0 | Status: DISCONTINUED | OUTPATIENT
Start: 2022-05-15 | End: 2022-05-15

## 2022-05-15 RX ORDER — HYDRALAZINE HCL 50 MG
10 TABLET ORAL ONCE
Refills: 0 | Status: COMPLETED | OUTPATIENT
Start: 2022-05-15 | End: 2022-05-15

## 2022-05-15 RX ORDER — SODIUM CHLORIDE 9 MG/ML
1000 INJECTION INTRAMUSCULAR; INTRAVENOUS; SUBCUTANEOUS
Refills: 0 | Status: DISCONTINUED | OUTPATIENT
Start: 2022-05-15 | End: 2022-05-19

## 2022-05-15 RX ADMIN — Medication 40 MILLIEQUIVALENT(S): at 08:44

## 2022-05-15 RX ADMIN — Medication 250 MILLIGRAM(S): at 05:04

## 2022-05-15 RX ADMIN — CEFEPIME 100 MILLIGRAM(S): 1 INJECTION, POWDER, FOR SOLUTION INTRAMUSCULAR; INTRAVENOUS at 14:26

## 2022-05-15 RX ADMIN — Medication 10 MILLIGRAM(S): at 09:47

## 2022-05-15 RX ADMIN — ENOXAPARIN SODIUM 40 MILLIGRAM(S): 100 INJECTION SUBCUTANEOUS at 08:43

## 2022-05-15 NOTE — PROGRESS NOTE ADULT - ASSESSMENT
· Assessment	  89 year old male PMH of HTN, HLD, DM, CAD, status post PC Alzheimer's dementia brought to the ED from the [facility] after being found on the floor of his apartment this morning. WAdmitted to SDU    #toxic metabolic encephalopathy 2/2 Sepsis secondary to acute cystitis -(WBC, tachy, lactate, source)  Rhabdomyolysis  -found this morning on the floor by staff at [facility]  -unknown what happened prior to being found, pt well the day before  -unable to acquire hx from pt as pt lethargic/obtunded  -meets sepsis criteria as wbc elevated at 15.84, tachy to 105 and grossly positive UA; lactate 3.4  -critical care following and will be admitted to SDU  -start cefepime 1g q24; BCx,UCx  -1 dose vanc, follow up BC and MRSA swab   -CK 1332, K 5.1 (hemolyzed); on NS 100c/hr for rhabdo  -trend CK; monitor renal function, CK in AM  -echo ordered, tropo 0.02 and 0.03. will repeat trop one more time , follow troponin   follow UA, urine culture blood culture ID consult  continue abx.    HTN  HLD  -will continue home toprol 100, amlodipine 5  -hold valsartan 320 for now to avoid hypotension in the setting of sepsis  -continue simvastatin 40    CAD s/p PCI  -continue ASA/plavix    T2DM  -as per pts son, pt hasn't been on meds because glucose well controlled  -follow A1C    Alzheimers dementia  -continue donepezil    #left clavicular fracture  orthopedics following  no acute intervention at this time      DVT ppx: lovenox  GI ppx: protonix  Activity: PT consult  Diet: speech and swallow consult  Dispo: SDU, acute  Code status: DNR/DNI      Manish Dallas MD  Hospitalist    · Assessment	  89 year old male PMH of HTN, HLD, DM, CAD, status post PC Alzheimer's dementia brought to the ED from the [facility] after being found on the floor of his apartment this morning. WAdmitted to SDU    #toxic metabolic encephalopathy 2/2 Sepsis secondary to acute cystitis -(WBC, tachy, lactate, source)  Rhabdomyolysis  -found this morning on the floor by staff at [facility]  -unknown what happened prior to being found, pt well the day before  -unable to acquire hx from pt as pt lethargic/obtunded  -meets sepsis criteria as wbc elevated at 15.84, tachy to 105 and grossly positive UA; lactate 3.4  -critical care following and will be admitted to SDU  -start cefepime 1g q2 12; BCx,UCx follow results   -1 dose vanc, follow up BC and MRSA swab   -, K 5.1 (hemolyzed); on NS 100c/hr for rhabdo  -trend CK; monitor renal function, CK in AM  -echo ordered, tropo 0.02 and 0.03. will repeat trop one more time , follow troponin   follow UA, urine culture blood culture ID consult, following, recommended to continue cefepime for now   continue abx.    HTN  elevated BP in AM  was given hydralazine   -will continue home toprol 100, amlodipine 5  -hold valsartan 320 for now to avoid hypotension in the setting of sepsis    HLD  -continue simvastatin 40    CAD s/p PCI  -continue ASA/plavix    T2DM  -as per pts son, pt hasn't been on meds because glucose well controlled  -follow A1C    Alzheimers dementia  -continue donepezil    #left clavicular fracture  orthopedics following  no acute intervention at this time      speech evaluation as patient is NPO. follow recommendations       DVT ppx: lovenox  GI ppx: protonix  Activity: PT consult  Diet: speech and swallow consult  Dispo: SDU, acute  Code status: DNR/DNI      Manish Dallas MD  Hospitalist

## 2022-05-15 NOTE — PROGRESS NOTE ADULT - SUBJECTIVE AND OBJECTIVE BOX
RENETTAAC  89y, Male  Allergy: No Known Allergies    Hospital Day: 1d    Patient seen and examined earlier today. admitted with AMS. elevated CK, speech saw the patient and recommended NPO for now.   Patient unable to follow commands     PMH/PSH:  PAST MEDICAL & SURGICAL HISTORY:  Alzheimer disease      Hypertension      Hyperlipidemia      Diabetes mellitus      Coronary artery disease      No significant past surgical history          LAST 24-Hr EVENTS:    VITALS:  T(F): 96.5 (22 @ 07:00), Max: 99.7 (22 @ 18:30)  HR: 75 (22 @ 11:00)  BP: 134/57 (22 @ 11:00) (130/69 - 196/84)  RR: 22 (22 @ 11:00)  SpO2: 96% (22 @ 11:00)          TESTS & MEASUREMENTS:  Weight/BMI  70.3 (22 @ 10:27)    22 @ 07:01  -  22 @ 07:00  --------------------------------------------------------  IN: 1125 mL / OUT: 430 mL / NET: 695 mL    22 @ 07:01  -  22 @ 12:13  --------------------------------------------------------  IN: 400 mL / OUT: 0 mL / NET: 400 mL                            16.1   11.47 )-----------( 194      ( 14 May 2022 05:53 )             46.3     PT/INR - ( 13 May 2022 09:30 )   PT: 11.40 sec;   INR: 0.99 ratio         PTT - ( 13 May 2022 09:30 )  PTT:36.0 sec  INR: 0.99 ratio (22 @ 09:30)        139  |  102  |  25<H>  ----------------------------<  126<H>  3.9   |  22  |  1.0    Ca    9.0      14 May 2022 05:53  Mg     2.3         TPro  5.5<L>  /  Alb  3.7  /  TBili  2.0<H>  /  DBili  x   /  AST  54<H>  /  ALT  22  /  AlkPhos  70      LIVER FUNCTIONS - ( 14 May 2022 05:53 )  Alb: 3.7 g/dL / Pro: 5.5 g/dL / ALK PHOS: 70 U/L / ALT: 22 U/L / AST: 54 U/L / GGT: x           CARDIAC MARKERS ( 14 May 2022 05:53 )  x     / 0.03 ng/mL / 2290 U/L / x     / x      CARDIAC MARKERS ( 14 May 2022 01:24 )  x     / x     / 2180 U/L / x     / x      CARDIAC MARKERS ( 13 May 2022 20:45 )  x     / 0.02 ng/mL / 1999 U/L / x     / x      CARDIAC MARKERS ( 13 May 2022 09:30 )  x     / <0.01 ng/mL / 1332 U/L / x     / x            Urinalysis Basic - ( 13 May 2022 09:30 )    Color: Yellow / Appearance: Slightly Turbid / S.017 / pH: x  Gluc: x / Ketone: Moderate  / Bili: Negative / Urobili: <2 mg/dL   Blood: x / Protein: 300 mg/dL / Nitrite: Positive   Leuk Esterase: Large / RBC: 18 /HPF /  /HPF   Sq Epi: x / Non Sq Epi: 0 /HPF / Bacteria: Many      Procalcitonin, Serum: 0.10 ng/mL (22 @ 16:43)          COVID-19 PCR: NotDetec (22 @ 09:30)        A1C with Estimated Average Glucose Result: 7.3 % (22 @ 16:43)      Indwelling Urethral Catheter:     Connect To:  Straight Drainage/Gravity    Indication:  Urine Output Monitoring in Critically Ill (22 @ 02:32) (not performed)      RADIOLOGY, ECG, & ADDITIONAL TESTS:  12 Lead ECG:   Ventricular Rate 102 BPM    Atrial Rate 102 BPM    P-R Interval 172 ms    QRS Duration 82 ms    Q-T Interval 374 ms    QTC Calculation(Bazett) 487 ms    P Axis 59 degrees    R Axis -57 degrees    T Axis 78 degrees    Diagnosis Line Sinus tachycardia  Left anterior fascicular block  Septal infarct , age undetermined  Inferior infarct , age undetermined  Abnormal ECG    Confirmed by JIAN ARBOLEDA MD ([id number]) on 2022 10:04:59 AM (22 @ 09:42)    CT Abdomen and Pelvis No Cont:   ACC: [id number] EXAM:  CT ABDOMEN AND PELVIS                        ACC: [id number] EXAM:  CT CHEST                          PROCEDURE DATE:  2022          INTERPRETATION:  CLINICAL HISTORY / REASON FOR EXAM: Trauma to chest,   abdomen and pelvis    TECHNIQUE: Contiguous axial CT images were obtained from the lower chest   to the pubic symphysis without intravenous contrast. Oral contrast was   not administered. Coronal, sagittal and 3D/MIP reformatted images are   also submitted.    COMPARISON CT: None.    OTHER STUDIES USED FOR CORRELATION: None.      FINDINGS: Limited evaluation without IV contrast.    CHEST:    TUBES/LINES: None.    LUNGS, PLEURA, AND AIRWAYS: Moderate to severe diffuse centrilobular   emphysema with an upper lobe predominance. Bibasilar   scarring/atelectasis. No pneumothorax. No consolidation or pleural   effusion. Central airways patent.    MEDIASTINUM/THORACIC NODES: No mediastinal or axillary lymphadenopathy.   Heterogeneous mildly enlarged right thyroid gland.    HEART/GREAT VESSELS: No pericardial effusion. Cardiomegaly. Coronary   artery and thoracic aorta atherosclerotic calcifications. No aneurysmal   dilation of the thoracic aorta.      ABDOMEN/PELVIS:    HEPATOBILIARY: Unremarkable.    SPLEEN: Unremarkable.    PANCREAS: Unremarkable.    ADRENAL GLANDS: Unremarkable.    KIDNEYS: Unremarkable.    ABDOMINOPELVIC NODES: Unremarkable.    PELVIC ORGANS: Enlarged prostate gland contains brachytherapy seeds. The   bladder is decompressed by Lagunas catheter.    PERITONEUM/MESENTERY/BOWEL: Moderate size hiatal hernia. No bowel   obstruction, ascites or intraperitoneal free air. Normal caliber   appendix. Colonic diverticulosis.    BONES/SOFT TISSUES: No acute osseous abnormality. Fragmentation of the   lateral aspect of the left clavicle which could be posttraumatic. Cannot   exclude a lytic lesion. Clinical correlation is advised. Multilevel   degenerative changes of the thoracolumbar spine.    Transitional vertebrae with sacralization of L5. Mild likely chronic   compression fracture of the L1 vertebral body.    VASCULAR: Calcified atherosclerotic disease of the abdominal aorta.      IMPRESSION:    No CT evidence of acute intrathoracic or intra-abdominal pathology.    Moderate to severe emphysema.    Enlarged prostate gland contains brachytherapy seeds.    Moderate-sized hiatal hernia.    Fragmentation of the lateral aspect of the left clavicle which could be   posttraumatic. Cannot exclude a lytic lesion. Clinical correlation is   advised.    Mild likely chronic compression fracture of the L1 vertebral body.    --- End of Report ---            FOREST GARDNER MD; Attending Radiologist  This document has been electronically signed. May 13 2022 10:33AM (22 @ 10:14)  CT Head No Cont:   ACC: [id number] EXAM:  CT BRAIN                          PROCEDURE DATE:  2022          INTERPRETATION:  Clinical History / Reason for exam: Trauma code    Technique: Noncontrast head CT.  Contiguous unenhanced CT axial images of   the head from the base to the vertex with coronal and sagittal reformats.    Comparison: None available    Findings:    The study is motion limited.    The ventricles and cortical sulci are compatible with a moderate degree   of parenchymal volume loss.    There are patchy and confluent hypodensities throughout the hemispheric   white matter without mass effect compatible with chronic microvascular   changes.    There are lacunar infarcts within bilateral deep gray nuclei, age   indeterminate.    There is no acute intracranial hemorrhage, extra-axial fluid collection   or midline shift.  Gray white matter differentiation is maintained.    There is a hyperdense partially calcified dural based lesion arising from   the right temporal convexity measuring 1.5cm compatible with a   meningioma. There is no mass effect or perilesional edema.    Vascular calcifications are noted.    There is right frontal, temporal and periorbital soft tissue swelling.    Maxillofacial CT is dictated separately.    IMPRESSION:    1.  No evidence of acute intracranial hemorrhage.    2.  Moderate/severe chronic microvascular changes and parenchymal   atrophy. 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   Incidental right temporal convexity meningioma measuring 1.5 cm.    3.  Right frontal, temporal and periorbital extracalvarial soft tissue   swelling.    --- End of Report ---            MIKE GOLDSTEIN MD; Attending Radiologist  This document has been electronically signed. May 13 2022 10:32AM (22 @ 10:13)    RECENT DIAGNOSTIC ORDERS:  Basic Metabolic Panel: AM Sched. Collection: 15-May-2022 04:30 (22 @ 12:12)  Complete Blood Count: AM Sched. Collection: 15-May-2022 04:30 (22 @ 12:12)  Creatine Kinase, Serum: AM Sched. Collection: 15-May-2022 04:30 (22 @ 12:12)  Xray Shoulder 2 Views, Left: Urgent   Indication: No Predefined Suggestions  Transport: Stretcher-Crib,  w/ Monitor,  w/ IV Pole  Exam Completed (22 @ 09:31)  Lactate, Blood: STAT  Addl Info: Draw repeat Lactate, STAT (22 @ 00:17)  Diet, NPO:   Except Medications (22 @ 16:41)  MRSA/MSSA PCR: 18:00 (22 @ 15:56)  TTE Echo Complete w/o Contrast w/ Doppler:   Transport: Stretcher-Crib  Monitor: w/o Monitor (22 @ 15:02)  Culture - Urine: 16:00  Specimen Source: Catheterized  Addl Info: If indwelling urinary catheter > 14 days, obtain an order to remove and replace prior to c (22 @ 15:02)  Culture - Blood: 16:00  Specimen Source: Blood-Peripheral (22 @ 15:02)      MEDICATIONS:  MEDICATIONS  (STANDING):  allopurinol 100 milliGRAM(s) Oral two times a day  amLODIPine   Tablet 2.5 milliGRAM(s) Oral daily  cefepime   IVPB      cefepime   IVPB 1000 milliGRAM(s) IV Intermittent every 24 hours  clopidogrel Tablet 75 milliGRAM(s) Oral daily  donepezil 5 milliGRAM(s) Oral at bedtime  enoxaparin Injectable 40 milliGRAM(s) SubCutaneous every 24 hours  metoprolol succinate  milliGRAM(s) Oral daily  pantoprazole    Tablet 40 milliGRAM(s) Oral before breakfast  simvastatin 40 milliGRAM(s) Oral at bedtime  sodium chloride 0.9%. 1000 milliLiter(s) (100 mL/Hr) IV Continuous <Continuous>  vancomycin  IVPB      vancomycin  IVPB 1000 milliGRAM(s) IV Intermittent every 12 hours    MEDICATIONS  (PRN):      HOME MEDICATIONS:  allopurinol 100 mg oral tablet ()  amLODIPine 2.5 mg oral tablet ()  donepezil 5 mg oral tablet ()  glyBURIDE 1.25 mg oral tablet ()  metoprolol succinate 100 mg oral tablet, extended release ()  Plavix 75 mg oral tablet ()  simvastatin 40 mg oral tablet ()  valsartan 320 mg oral tablet (05-13)      PHYSICAL EXAM:  GENERAL: Patient lying on bed, unable to follow commands   CHEST/LUNG: NVB, no wheezing   HEART: R1+R2, RRR  ABDOMEN: Soft. non tender, BS positive  EXTREMITIES:  no edema, Bruising             RENETTA AC  89y, Male  Allergy: No Known Allergies    Hospital Day: 1d    Patient seen and examined earlier today. Admitted with AMS. elevated CK, speech saw the patient and recommended NPO for now.   Patient unable to follow commands   had a long discussion with son Wesly [phone]. who confirmed me patient is DNR. He also mentioned to me that in his ([facility]) living will he mentioned that patient does not want any artifical form of nutrition. explained to patients son that patient is still critical and high risk of demise. son understands.     PMH/PSH:  PAST MEDICAL & SURGICAL HISTORY:  Alzheimer disease      Hypertension      Hyperlipidemia      Diabetes mellitus      Coronary artery disease      No significant past surgical history          LAST 24-Hr EVENTS:    VITALS:  Vital Signs Last 24 Hrs  T(C): 36.2 (15 May 2022 11:44), Max: 36.2 (14 May 2022 23:00)  T(F): 97.1 (15 May 2022 11:44), Max: 97.2 (14 May 2022 23:00)  HR: 74 (15 May 2022 12:00) (65 - 93)  BP: 129/78 (15 May 2022 12:00) (128/60 - 225/122)  BP(mean): 94 (15 May 2022 12:00) (94 - 164)  RR: 29 (15 May 2022 12:00) (18 - 32)  SpO2: 95% (15 May 2022 12:00) (93% - 98%)          TESTS & MEASUREMENTS:  Weight/BMI  70.3 (22 @ 10:27)    22 @ 07:01  -  22 @ 07:00  --------------------------------------------------------  IN: 1125 mL / OUT: 430 mL / NET: 695 mL    22 @ 07:01  -  22 @ 12:13  --------------------------------------------------------  IN: 400 mL / OUT: 0 mL / NET: 400 mL                            16.1   11.47 )-----------( 194      ( 14 May 2022 05:53 )             46.3     PT/INR - ( 13 May 2022 09:30 )   PT: 11.40 sec;   INR: 0.99 ratio         PTT - ( 13 May 2022 09:30 )  PTT:36.0 sec  INR: 0.99 ratio (22 @ 09:30)        139  |  102  |  25<H>  ----------------------------<  126<H>  3.9   |  22  |  1.0    Ca    9.0      14 May 2022 05:53  Mg     2.3         TPro  5.5<L>  /  Alb  3.7  /  TBili  2.0<H>  /  DBili  x   /  AST  54<H>  /  ALT  22  /  AlkPhos  70      LIVER FUNCTIONS - ( 14 May 2022 05:53 )  Alb: 3.7 g/dL / Pro: 5.5 g/dL / ALK PHOS: 70 U/L / ALT: 22 U/L / AST: 54 U/L / GGT: x           CARDIAC MARKERS ( 14 May 2022 05:53 )  x     / 0.03 ng/mL / 2290 U/L / x     / x      CARDIAC MARKERS ( 14 May 2022 01:24 )  x     / x     / 2180 U/L / x     / x      CARDIAC MARKERS ( 13 May 2022 20:45 )  x     / 0.02 ng/mL / 1999 U/L / x     / x      CARDIAC MARKERS ( 13 May 2022 09:30 )  x     / <0.01 ng/mL / 1332 U/L / x     / x            Urinalysis Basic - ( 13 May 2022 09:30 )    Color: Yellow / Appearance: Slightly Turbid / S.017 / pH: x  Gluc: x / Ketone: Moderate  / Bili: Negative / Urobili: <2 mg/dL   Blood: x / Protein: 300 mg/dL / Nitrite: Positive   Leuk Esterase: Large / RBC: 18 /HPF /  /HPF   Sq Epi: x / Non Sq Epi: 0 /HPF / Bacteria: Many      Procalcitonin, Serum: 0.10 ng/mL (22 @ 16:43)          COVID-19 PCR: NotDetec (22 @ 09:30)        A1C with Estimated Average Glucose Result: 7.3 % (22 @ 16:43)      Indwelling Urethral Catheter:     Connect To:  Straight Drainage/Gravity    Indication:  Urine Output Monitoring in Critically Ill (22 @ 02:32) (not performed)      RADIOLOGY, ECG, & ADDITIONAL TESTS:  12 Lead ECG:   Ventricular Rate 102 BPM    Atrial Rate 102 BPM    P-R Interval 172 ms    QRS Duration 82 ms    Q-T Interval 374 ms    QTC Calculation(Bazett) 487 ms    P Axis 59 degrees    R Axis -57 degrees    T Axis 78 degrees    Diagnosis Line Sinus tachycardia  Left anterior fascicular block  Septal infarct , age undetermined  Inferior infarct , age undetermined  Abnormal ECG    Confirmed by JIAN ARBOLEDA MD ([id number]) on 2022 10:04:59 AM (22 @ 09:42)    CT Abdomen and Pelvis No Cont:   ACC: [id number] EXAM:  CT ABDOMEN AND PELVIS                        ACC: [id number] EXAM:  CT CHEST                          PROCEDURE DATE:  2022          INTERPRETATION:  CLINICAL HISTORY / REASON FOR EXAM: Trauma to chest,   abdomen and pelvis    TECHNIQUE: Contiguous axial CT images were obtained from the lower chest   to the pubic symphysis without intravenous contrast. Oral contrast was   not administered. Coronal, sagittal and 3D/MIP reformatted images are   also submitted.    COMPARISON CT: None.    OTHER STUDIES USED FOR CORRELATION: None.      FINDINGS: Limited evaluation without IV contrast.    CHEST:    TUBES/LINES: None.    LUNGS, PLEURA, AND AIRWAYS: Moderate to severe diffuse centrilobular   emphysema with an upper lobe predominance. Bibasilar   scarring/atelectasis. No pneumothorax. No consolidation or pleural   effusion. Central airways patent.    MEDIASTINUM/THORACIC NODES: No mediastinal or axillary lymphadenopathy.   Heterogeneous mildly enlarged right thyroid gland.    HEART/GREAT VESSELS: No pericardial effusion. Cardiomegaly. Coronary   artery and thoracic aorta atherosclerotic calcifications. No aneurysmal   dilation of the thoracic aorta.      ABDOMEN/PELVIS:    HEPATOBILIARY: Unremarkable.    SPLEEN: Unremarkable.    PANCREAS: Unremarkable.    ADRENAL GLANDS: Unremarkable.    KIDNEYS: Unremarkable.    ABDOMINOPELVIC NODES: Unremarkable.    PELVIC ORGANS: Enlarged prostate gland contains brachytherapy seeds. The   bladder is decompressed by Lagunas catheter.    PERITONEUM/MESENTERY/BOWEL: Moderate size hiatal hernia. No bowel   obstruction, ascites or intraperitoneal free air. Normal caliber   appendix. Colonic diverticulosis.    BONES/SOFT TISSUES: No acute osseous abnormality. Fragmentation of the   lateral aspect of the left clavicle which could be posttraumatic. Cannot   exclude a lytic lesion. Clinical correlation is advised. Multilevel   degenerative changes of the thoracolumbar spine.    Transitional vertebrae with sacralization of L5. Mild likely chronic   compression fracture of the L1 vertebral body.    VASCULAR: Calcified atherosclerotic disease of the abdominal aorta.      IMPRESSION:    No CT evidence of acute intrathoracic or intra-abdominal pathology.    Moderate to severe emphysema.    Enlarged prostate gland contains brachytherapy seeds.    Moderate-sized hiatal hernia.    Fragmentation of the lateral aspect of the left clavicle which could be   posttraumatic. Cannot exclude a lytic lesion. Clinical correlation is   advised.    Mild likely chronic compression fracture of the L1 vertebral body.    --- End of Report ---            FOREST GARDNER MD; Attending Radiologist  This document has been electronically signed. May 13 2022 10:33AM (22 @ 10:14)  CT Head No Cont:   ACC: [id number] EXAM:  CT BRAIN                          PROCEDURE DATE:  2022          INTERPRETATION:  Clinical History / Reason for exam: Trauma code    Technique: Noncontrast head CT.  Contiguous unenhanced CT axial images of   the head from the base to the vertex with coronal and sagittal reformats.    Comparison: None available    Findings:    The study is motion limited.    The ventricles and cortical sulci are compatible with a moderate degree   of parenchymal volume loss.    There are patchy and confluent hypodensities throughout the hemispheric   white matter without mass effect compatible with chronic microvascular   changes.    There are lacunar infarcts within bilateral deep gray nuclei, age   indeterminate.    There is no acute intracranial hemorrhage, extra-axial fluid collection   or midline shift.  Gray white matter differentiation is maintained.    There is a hyperdense partially calcified dural based lesion arising from   the right temporal convexity measuring 1.5cm compatible with a   meningioma. There is no mass effect or perilesional edema.    Vascular calcifications are noted.    There is right frontal, temporal and periorbital soft tissue swelling.    Maxillofacial CT is dictated separately.    IMPRESSION:    1.  No evidence of acute intracranial hemorrhage.    2.  Moderate/severe chronic microvascular changes and parenchymal   atrophy. 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   Incidental right temporal convexity meningioma measuring 1.5 cm.    3.  Right frontal, temporal and periorbital extracalvarial soft tissue   swelling.    --- End of Report ---            MIKE GOLDSTEIN MD; Attending Radiologist  This document has been electronically signed. May 13 2022 10:32AM (22 @ 10:13)    RECENT DIAGNOSTIC ORDERS:  Basic Metabolic Panel: AM Sched. Collection: 15-May-2022 04:30 (22 @ 12:12)  Complete Blood Count: AM Sched. Collection: 15-May-2022 04:30 (22 @ 12:12)  Creatine Kinase, Serum: AM Sched. Collection: 15-May-2022 04:30 (22 @ 12:12)  Xray Shoulder 2 Views, Left: Urgent   Indication: No Predefined Suggestions  Transport: Stretcher-Crib,  w/ Monitor,  w/ IV Pole  Exam Completed (22 @ 09:31)  Lactate, Blood: STAT  Addl Info: Draw repeat Lactate, STAT (22 @ 00:17)  Diet, NPO:   Except Medications (22 @ 16:41)  MRSA/MSSA PCR: 18:00 (22 @ 15:56)  TTE Echo Complete w/o Contrast w/ Doppler:   Transport: Stretcher-Crib  Monitor: w/o Monitor (22 @ 15:02)  Culture - Urine: 16:00  Specimen Source: Catheterized  Addl Info: If indwelling urinary catheter > 14 days, obtain an order to remove and replace prior to c (22 @ 15:02)  Culture - Blood: 16:00  Specimen Source: Blood-Peripheral (22 @ 15:02)      MEDICATIONS:  MEDICATIONS  (STANDING):  allopurinol 100 milliGRAM(s) Oral two times a day  amLODIPine   Tablet 2.5 milliGRAM(s) Oral daily  cefepime   IVPB      cefepime   IVPB 1000 milliGRAM(s) IV Intermittent every 24 hours  clopidogrel Tablet 75 milliGRAM(s) Oral daily  donepezil 5 milliGRAM(s) Oral at bedtime  enoxaparin Injectable 40 milliGRAM(s) SubCutaneous every 24 hours  metoprolol succinate  milliGRAM(s) Oral daily  pantoprazole    Tablet 40 milliGRAM(s) Oral before breakfast  simvastatin 40 milliGRAM(s) Oral at bedtime  sodium chloride 0.9%. 1000 milliLiter(s) (100 mL/Hr) IV Continuous <Continuous>  vancomycin  IVPB      vancomycin  IVPB 1000 milliGRAM(s) IV Intermittent every 12 hours    MEDICATIONS  (PRN):      HOME MEDICATIONS:  allopurinol 100 mg oral tablet ()  amLODIPine 2.5 mg oral tablet ()  donepezil 5 mg oral tablet ()  glyBURIDE 1.25 mg oral tablet ()  metoprolol succinate 100 mg oral tablet, extended release ()  Plavix 75 mg oral tablet ()  simvastatin 40 mg oral tablet ()  valsartan 320 mg oral tablet ()      PHYSICAL EXAM:  GENERAL: Patient lying on bed, unable to follow commands   CHEST/LUNG: NVB, no wheezing   HEART: R1+R2, RRR  ABDOMEN: Soft. non tender, BS positive  EXTREMITIES:  no edema, Bruising

## 2022-05-15 NOTE — CONSULT NOTE ADULT - ASSESSMENT
ASSESSMENT  89 year old male PMH of HTN, HLD, DM, CAD status post PCI, Alzheimer's dementia brought to the ED from the [facility] after being found on the floor    IMPRESSION  #s/p Fall  #Toxic Metabolic Encephalopathy  #Rhabdomyolysis  #Sepsis on admission (WBC>12, Tachycardia) Suspected UTI   - Blood Cx 5/13 NG     #Elevated T bili  #Lactic acidosis  #Hyponatremia  #Obesity  #DM  #Abx allergy: NKDA    RECOMMENDATIONS  - stop vancomycin   - continue cefepime -- adjust to 1g q 12 hours   - follow-up urine Cx  - trend T dili, check RUQ US     Please call or message on [employer] Teams if with any questions.  Spectra [phone]

## 2022-05-15 NOTE — PROGRESS NOTE ADULT - ASSESSMENT
IMPRESSION:  Sepsis present on admission  UTI  Rhabdomyolysis improving   HO DM  HO CAD s/p PCI  HO Dementia (AOx2 at baseline)  AMS likely toxic metabolic encephalopathy     PLAN:    CNS: Avoid CNS depressants.     HEENT: Oral care    PULMONARY:  HOB @ 45 degrees.  Aspiration precautions. Target SpO2 92-94%, titrate oxygen as tolerated.     CARDIOVASCULAR: Avoid volume overload. Gentle hydration with LR 50 cc per hour.      GI: GI prophylaxis.  Feeding as tolerated per speech.     RENAL:  Follow up lytes. Correct as needed. Trend CPK.     INFECTIOUS DISEASE: Follow up cultures. ID evaluation appreciated.  Agree wiht Cefepime     HEMATOLOGICAL:  DVT prophylaxis. Dimer     ENDOCRINE:  Follow up FS. Target -180.     MUSCULOSKELETAL: bedrest    GOC done, family agreeable to DNR/DNI     Downgrade to med surg

## 2022-05-15 NOTE — CONSULT NOTE ADULT - SUBJECTIVE AND OBJECTIVE BOX
AC JACKSON  89y, Male  Allergy: No Known Allergies      CHIEF COMPLAINT: Sepsis (14 May 2022 12:13)      LOS  1d    HPI:  89 year old male PMH of HTN, HLD, DM, CAD status post PCI, Alzheimer's dementia brought to the ED from the [facility] after being found on the floor of his apartment this morning. Pt was found this morning on the floor by staff minimally responsive and unknown what happened prior to him being found on the floor. Pt was well the day prior. Unable to give story at the time of my assessment as pt appears very confused.    In the ED, pt tachy to 105. Labs notable for wbc 15.84, K 5.1 (hemolyzed), T bili 1.9, lactate 3.4, CK 1332. UA grossly positive. Trauma workup showed L1 chronic compression fracture, right frontal, temporal periorbital and facial swelling; otherwise negative for any acute trauma.    Confirmed medrec with pts son Wesly over the phone. Also confirmed DNR/DNI status with pts son and filled out updated MOLST form. (13 May 2022 13:56)      INFECTIOUS DISEASE HISTORY:  History as above.  Found down on the floor.  Patient is limited historian, non0-verbal       PAST MEDICAL & SURGICAL HISTORY:  Alzheimer disease      Hypertension      Hyperlipidemia      Diabetes mellitus      Coronary artery disease      No significant past surgical history          FAMILY HISTORY  No pertinent family history in first degree relatives        SOCIAL HISTORY  Social History:  Denies smoking, drugs and etoh (13 May 2022 13:56)        ROS  unable to obtain history secondary to patient's mental status and/or sedation    VITALS:  T(F): 96.5, Max: 97.8 (22 @ 06:02)  HR: 72  BP: 161/74  RR: 26Vital Signs Last 24 Hrs  T(C): 35.8 (14 May 2022 20:17), Max: 36.6 (14 May 2022 06:02)  T(F): 96.5 (14 May 2022 20:17), Max: 97.8 (14 May 2022 06:02)  HR: 72 (14 May 2022 20:17) (56 - 148)  BP: 161/74 (14 May 2022 20:17) (134/57 - 225/122)  BP(mean): 106 (14 May 2022 20:17) (82 - 164)  RR: 26 (14 May 2022 20:17) (18 - 48)  SpO2: 93% (14 May 2022 20:17) (93% - 99%)    PHYSICAL EXAM:  Gen: NAD, resting in bed  HEENT: Normocephalic, atraumatic  Neck: supple, no lymphadenopathy  CV: Regular rate & regular rhythm  Lungs: decreased BS at bases, no fremitus  Abdomen: Soft, BS present  Ext: Warm, well perfused  Neuro: non focal, awake  Skin: no rash, no erythema  Lines: no phlebitis    TESTS & MEASUREMENTS:                        16.1  11.47 )-----------( 194      ( 14 May 2022 05:53 )             46.3    05    139  |  102  |  25<H>  ----------------------------<  126<H>  3.9   |  22  |  1.0    Ca    9.0      14 May 2022 05:53  Mg     2.3         TPro  5.5<L>  /  Alb  3.7  /  TBili  2.0<H>  /  DBili  x   /  AST  54<H>  /  ALT  22  /  AlkPhos  70        LIVER FUNCTIONS - ( 14 May 2022 05:53 )  Alb: 3.7 g/dL / Pro: 5.5 g/dL / ALK PHOS: 70 U/L / ALT: 22 U/L / AST: 54 U/L / GGT: x          Urinalysis Basic - ( 14 May 2022 14:50 )    Color: Yellow / Appearance: Turbid / S.023 / pH: x  Gluc: x / Ketone: Small  / Bili: Negative / Urobili: <2 mg/dL  Blood: x / Protein: 100 mg/dL / Nitrite: Negative  Leuk Esterase: Large / RBC: 5 /HPF / WBC >720 /HPF  Sq Epi: x / Non Sq Epi: 0 /HPF / Bacteria: Negative        Culture - Blood (collected 22 @ 16:43)  Source: .Blood None  Preliminary Report (22 @ 23:02):    No growth to date.    Culture - Blood (collected 22 @ 10:42)  Source: .Blood Blood  Preliminary Report (22 @ 23:02):    No growth to date.    Culture - Blood (collected 22 @ 10:42)  Source: .Blood Blood  Preliminary Report (22 @ 23:02):    No growth to date.        Lactate, Blood: 4.1 mmol/L (22 @ 20:45)  Blood Gas Venous - Lactate: 3.10 mmol/L (22 @ 12:07)  Lactate, Blood: 3.4 mmol/L (22 @ 09:30)      INFECTIOUS DISEASES TESTING  Procalcitonin, Serum: 0.10 ng/mL (22 @ 16:43)  COVID-19 PCR: NotDetec (22 @ 09:30)      RADIOLOGY & ADDITIONAL TESTS:  I have personally reviewed the last Chest xray  CXR  Xray Chest 1 View AP/PA:  ACC: [id number] EXAM:  XR CHEST 1 VIEW                          PROCEDURE DATE:  2022          INTERPRETATION:  STUDY INDICATION: Trauma Code    Comparison: 10/24/2014.    Technique/Positioning: Frontal view of the chest were obtained.    Findings:    Support devices: None.    Cardiac/mediastinum/hilum: Stable cardiomediastinal silhouette.    Lung parenchyma/Pleura: No consolidation, effusion or pneumothorax.    Skeleton/soft tissues:  No radiographically evident acute displaced  fracture within the limitations of this exam.    Impression:    No radiographically evident acute displaced fracture within the  limitations of this exam.    --- End of Report ---            FIORELLA AMATO MD; Attending Radiologist  This document has been electronically signed. May 13 2022 11:44AM (22 @ 10:00)      CT  CT Abdomen and Pelvis No Cont:  ACC: [id number] EXAM:  CT ABDOMEN AND PELVIS                        ACC: [id number] EXAM:  CT CHEST                          PROCEDURE DATE:  2022          INTERPRETATION:  CLINICAL HISTORY / REASON FOR EXAM: Trauma to chest,  abdomen and pelvis    TECHNIQUE: Contiguous axial CT images were obtained from the lower chest  to the pubic symphysis without intravenous contrast. Oral contrast was  not administered. Coronal, sagittal and 3D/MIP reformatted images are  also submitted.    COMPARISON CT: None.    OTHER STUDIES USED FOR CORRELATION: None.      FINDINGS: Limited evaluation without IV contrast.    CHEST:    TUBES/LINES: None.    LUNGS, PLEURA, AND AIRWAYS: Moderate to severe diffuse centrilobular  emphysema with an upper lobe predominance. Bibasilar  scarring/atelectasis. No pneumothorax. No consolidation or pleural  effusion. Central airways patent.    MEDIASTINUM/THORACIC NODES: No mediastinal or axillary lymphadenopathy.  Heterogeneous mildly enlarged right thyroid gland.    HEART/GREAT VESSELS: No pericardial effusion. Cardiomegaly. Coronary  artery and thoracic aorta atherosclerotic calcifications. No aneurysmal  dilation of the thoracic aorta.      ABDOMEN/PELVIS:    HEPATOBILIARY: Unremarkable.    SPLEEN: Unremarkable.    PANCREAS: Unremarkable.    ADRENAL GLANDS: Unremarkable.    KIDNEYS: Unremarkable.    ABDOMINOPELVIC NODES: Unremarkable.    PELVIC ORGANS: Enlarged prostate gland contains brachytherapy seeds. The  bladder is decompressed by Lagunas catheter.    PERITONEUM/MESENTERY/BOWEL: Moderate size hiatal hernia. No bowel  obstruction, ascites or intraperitoneal free air. Normal caliber  appendix. Colonic diverticulosis.    BONES/SOFT TISSUES: No acute osseous abnormality. Fragmentation of the  lateral aspect of the left clavicle which could be posttraumatic. Cannot  exclude a lytic lesion. Clinical correlation is advised. Multilevel  degenerative changes of the thoracolumbar spine.    Transitional vertebrae with sacralization of L5. Mild likely chronic  compression fracture of the L1 vertebral body.    VASCULAR: Calcified atherosclerotic disease of the abdominal aorta.      IMPRESSION:    No CT evidence of acute intrathoracic or intra-abdominal pathology.    Moderate to severe emphysema.    Enlarged prostate gland contains brachytherapy seeds.    Moderate-sized hiatal hernia.    Fragmentation of the lateral aspect of the left clavicle which could be  posttraumatic. Cannot exclude a lytic lesion. Clinical correlation is  advised.    Mild likely chronic compression fracture of the L1 vertebral body.    --- End of Report ---            FOREST GARDNER MD; Attending Radiologist  This document has been electronically signed. May 13 2022 10:33AM (22 @ 10:14)      CARDIOLOGY TESTING  12 Lead ECG:  Ventricular Rate 102 BPM    Atrial Rate 102 BPM    P-R Interval 172 ms    QRS Duration 82 ms    Q-T Interval 374 ms    QTC Calculation(Bazett) 487 ms    P Axis 59 degrees    R Axis -57 degrees    T Axis 78 degrees    Diagnosis Line Sinus tachycardia  Left anterior fascicular block  Septal infarct , age undetermined  Inferior infarct , age undetermined  Abnormal ECG    Confirmed by JIAN ARBOLEDA MD ([id number]) on 2022 10:04:59 AM (22 @ 09:42)      MEDICATIONS  allopurinol 100 Oral two times a day  amLODIPine   Tablet 2.5 Oral daily  cefepime   IVPB    cefepime   IVPB 1000 IV Intermittent every 24 hours  clopidogrel Tablet 75 Oral daily  donepezil 5 Oral at bedtime  enoxaparin Injectable 40 SubCutaneous every 24 hours  metoprolol succinate  Oral daily  pantoprazole    Tablet 40 Oral before breakfast  simvastatin 40 Oral at bedtime  sodium chloride 0.9%. 1000 IV Continuous <Continuous>  vancomycin  IVPB    vancomycin  IVPB 1000 IV Intermittent every 12 hours      Weight  Weight (kg): 70.3 (22 @ 10:27)    ANTIBIOTICS:  cefepime   IVPB      cefepime   IVPB 1000 milliGRAM(s) IV Intermittent every 24 hours  vancomycin  IVPB      vancomycin  IVPB 1000 milliGRAM(s) IV Intermittent every 12 hours      ALLERGIES:  No Known Allergies

## 2022-05-15 NOTE — CONSULT NOTE ADULT - TIME BILLING
I have personally seen and examined this patient.    I have reviewed all pertinent clinical information and reviewed all relevant imaging and diagnostic studies personally.   I counseled the patient about diagnostic testing and treatment plan. All questions were answered.   I discussed recommendations with the primary team.
patient's care

## 2022-05-15 NOTE — PROGRESS NOTE ADULT - SUBJECTIVE AND OBJECTIVE BOX
Patient is a 89y old  Male who presents with a chief complaint of Sepsis (15 May 2022 09:24)        Over Night Events:  Off pressors.  On NC.          ROS:     All ROS are negative except HPI         PHYSICAL EXAM    ICU Vital Signs Last 24 Hrs  T(C): 36.1 (15 May 2022 08:00), Max: 36.2 (14 May 2022 23:00)  T(F): 97 (15 May 2022 08:00), Max: 97.2 (14 May 2022 23:00)  HR: 65 (15 May 2022 08:00) (65 - 93)  BP: 196/74 (15 May 2022 08:00) (128/60 - 225/122)  BP(mean): 106 (14 May 2022 20:17) (82 - 164)  ABP: --  ABP(mean): --  RR: 32 (15 May 2022 08:00) (18 - 32)  SpO2: 94% (15 May 2022 08:00) (93% - 98%)      CONSTITUTIONAL:  Ill appearing in NAD    ENT:   Airway patent,   Mouth with normal mucosa.     EYES:   Pupils equal,   Round and reactive to light.    CARDIAC:   Normal rate,   Regular rhythm.        RESPIRATORY:   No wheezing  Bilateral BS  Normal chest expansion  Not tachypneic,  No use of accessory muscles    GASTROINTESTINAL:  Abdomen soft,   Non-tender,   No guarding,   + BS    MUSCULOSKELETAL:   Range of motion is not limited,  No clubbing, cyanosis    NEUROLOGICAL:   Alert   Confused     SKIN:   Skin normal color for race,     PSYCHIATRIC:   Normal mood and affect.   No apparent risk to self or others.          22 @ 07:01  -  05-15-22 @ 07:00  --------------------------------------------------------  IN:    IV PiggyBack: 300 mL    sodium chloride 0.9%: 2400 mL  Total IN: 2700 mL    OUT:    Indwelling Catheter - Urethral (mL): 1350 mL  Total OUT: 1350 mL    Total NET: 1350 mL      05-15-22 @ 07:01  -  05-15-22 @ 10:17  --------------------------------------------------------  IN:    IV PiggyBack: 100 mL    sodium chloride 0.9%: 300 mL  Total IN: 400 mL    OUT:    Indwelling Catheter - Urethral (mL): 100 mL  Total OUT: 100 mL    Total NET: 300 mL          LABS:                            14.8   10.20 )-----------( 182      ( 15 May 2022 05:19 )             42.6                                               05-15    141  |  105  |  20  ----------------------------<  110<H>  3.2<L>   |  18  |  0.9    Ca    7.8<L>      15 May 2022 05:19  Mg     2.3         TPro  5.5<L>  /  Alb  3.7  /  TBili  2.0<H>  /  DBili  x   /  AST  54<H>  /  ALT  22  /  AlkPhos  70                                               Urinalysis Basic - ( 14 May 2022 14:50 )    Color: Yellow / Appearance: Turbid / S.023 / pH: x  Gluc: x / Ketone: Small  / Bili: Negative / Urobili: <2 mg/dL   Blood: x / Protein: 100 mg/dL / Nitrite: Negative   Leuk Esterase: Large / RBC: 5 /HPF / WBC >720 /HPF   Sq Epi: x / Non Sq Epi: 0 /HPF / Bacteria: Negative        CARDIAC MARKERS ( 15 May 2022 05:19 )  x     / x     / 640 U/L / x     / x      CARDIAC MARKERS ( 14 May 2022 17:36 )  x     / 0.03 ng/mL / x     / x     / x      CARDIAC MARKERS ( 14 May 2022 05:53 )  x     / 0.03 ng/mL / 2290 U/L / x     / x      CARDIAC MARKERS ( 14 May 2022 01:24 )  x     / x     / 2180 U/L / x     / x      CARDIAC MARKERS ( 13 May 2022 20:45 )  x     / 0.02 ng/mL / 1999 U/L / x     / x                                                LIVER FUNCTIONS - ( 14 May 2022 05:53 )  Alb: 3.7 g/dL / Pro: 5.5 g/dL / ALK PHOS: 70 U/L / ALT: 22 U/L / AST: 54 U/L / GGT: x                                                  Culture - Blood (collected 13 May 2022 16:43)  Source: .Blood None  Preliminary Report (14 May 2022 23:02):    No growth to date.    Culture - Blood (collected 13 May 2022 10:42)  Source: .Blood Blood  Preliminary Report (14 May 2022 23:02):    No growth to date.    Culture - Blood (collected 13 May 2022 10:42)  Source: .Blood Blood  Preliminary Report (14 May 2022 23:02):    No growth to date.                                                                                           MEDICATIONS  (STANDING):  allopurinol 100 milliGRAM(s) Oral two times a day  amLODIPine   Tablet 2.5 milliGRAM(s) Oral daily  cefepime   IVPB      cefepime   IVPB 1000 milliGRAM(s) IV Intermittent every 24 hours  clopidogrel Tablet 75 milliGRAM(s) Oral daily  donepezil 5 milliGRAM(s) Oral at bedtime  enoxaparin Injectable 40 milliGRAM(s) SubCutaneous every 24 hours  metoprolol succinate  milliGRAM(s) Oral daily  pantoprazole    Tablet 40 milliGRAM(s) Oral before breakfast  simvastatin 40 milliGRAM(s) Oral at bedtime  sodium chloride 0.9%. 1000 milliLiter(s) (100 mL/Hr) IV Continuous <Continuous>  vancomycin  IVPB      vancomycin  IVPB 1000 milliGRAM(s) IV Intermittent every 12 hours    MEDICATIONS  (PRN):      New X-rays reviewed:                                                                                  ECHO

## 2022-05-16 LAB
-  AMIKACIN: SIGNIFICANT CHANGE UP
-  AMOXICILLIN/CLAVULANIC ACID: SIGNIFICANT CHANGE UP
-  AMPICILLIN/SULBACTAM: SIGNIFICANT CHANGE UP
-  AMPICILLIN: SIGNIFICANT CHANGE UP
-  AZTREONAM: SIGNIFICANT CHANGE UP
-  CEFAZOLIN: SIGNIFICANT CHANGE UP
-  CEFEPIME: SIGNIFICANT CHANGE UP
-  CEFOXITIN: SIGNIFICANT CHANGE UP
-  CEFTRIAXONE: SIGNIFICANT CHANGE UP
-  CIPROFLOXACIN: SIGNIFICANT CHANGE UP
-  ERTAPENEM: SIGNIFICANT CHANGE UP
-  GENTAMICIN: SIGNIFICANT CHANGE UP
-  IMIPENEM: SIGNIFICANT CHANGE UP
-  LEVOFLOXACIN: SIGNIFICANT CHANGE UP
-  MEROPENEM: SIGNIFICANT CHANGE UP
-  NITROFURANTOIN: SIGNIFICANT CHANGE UP
-  PIPERACILLIN/TAZOBACTAM: SIGNIFICANT CHANGE UP
-  TIGECYCLINE: SIGNIFICANT CHANGE UP
-  TOBRAMYCIN: SIGNIFICANT CHANGE UP
-  TRIMETHOPRIM/SULFAMETHOXAZOLE: SIGNIFICANT CHANGE UP
ALBUMIN SERPL ELPH-MCNC: 3 G/DL — LOW (ref 3.5–5.2)
ALBUMIN SERPL ELPH-MCNC: 3.3 G/DL — LOW (ref 3.5–5.2)
ALP SERPL-CCNC: 62 U/L — SIGNIFICANT CHANGE UP (ref 30–115)
ALP SERPL-CCNC: 62 U/L — SIGNIFICANT CHANGE UP (ref 30–115)
ALT FLD-CCNC: 18 U/L — SIGNIFICANT CHANGE UP (ref 0–41)
ALT FLD-CCNC: 18 U/L — SIGNIFICANT CHANGE UP (ref 0–41)
ANION GAP SERPL CALC-SCNC: 20 MMOL/L — HIGH (ref 7–14)
AST SERPL-CCNC: 24 U/L — SIGNIFICANT CHANGE UP (ref 0–41)
AST SERPL-CCNC: 25 U/L — SIGNIFICANT CHANGE UP (ref 0–41)
BILIRUB DIRECT SERPL-MCNC: 0.5 MG/DL — HIGH (ref 0–0.3)
BILIRUB DIRECT SERPL-MCNC: 0.5 MG/DL — HIGH (ref 0–0.3)
BILIRUB INDIRECT FLD-MCNC: 2.5 MG/DL — HIGH (ref 0.2–1.2)
BILIRUB INDIRECT FLD-MCNC: 2.5 MG/DL — HIGH (ref 0.2–1.2)
BILIRUB SERPL-MCNC: 2.9 MG/DL — HIGH (ref 0.2–1.2)
BILIRUB SERPL-MCNC: 3 MG/DL — HIGH (ref 0.2–1.2)
BILIRUB SERPL-MCNC: 3 MG/DL — HIGH (ref 0.2–1.2)
BUN SERPL-MCNC: 20 MG/DL — SIGNIFICANT CHANGE UP (ref 10–20)
CALCIUM SERPL-MCNC: 8.4 MG/DL — LOW (ref 8.5–10.1)
CHLORIDE SERPL-SCNC: 106 MMOL/L — SIGNIFICANT CHANGE UP (ref 98–110)
CK SERPL-CCNC: 370 U/L — HIGH (ref 0–225)
CO2 SERPL-SCNC: 17 MMOL/L — SIGNIFICANT CHANGE UP (ref 17–32)
CREAT SERPL-MCNC: 0.9 MG/DL — SIGNIFICANT CHANGE UP (ref 0.7–1.5)
CULTURE RESULTS: SIGNIFICANT CHANGE UP
EGFR: 79 ML/MIN/1.73M2 — SIGNIFICANT CHANGE UP
GLUCOSE BLDC GLUCOMTR-MCNC: 105 MG/DL — HIGH (ref 70–99)
GLUCOSE BLDC GLUCOMTR-MCNC: 111 MG/DL — HIGH (ref 70–99)
GLUCOSE SERPL-MCNC: 99 MG/DL — SIGNIFICANT CHANGE UP (ref 70–99)
HCT VFR BLD CALC: 42.3 % — SIGNIFICANT CHANGE UP (ref 42–52)
HGB BLD-MCNC: 15 G/DL — SIGNIFICANT CHANGE UP (ref 14–18)
LACTATE SERPL-SCNC: 1.7 MMOL/L — SIGNIFICANT CHANGE UP (ref 0.7–2)
MAGNESIUM SERPL-MCNC: 1.9 MG/DL — SIGNIFICANT CHANGE UP (ref 1.8–2.4)
MCHC RBC-ENTMCNC: 30 PG — SIGNIFICANT CHANGE UP (ref 27–31)
MCHC RBC-ENTMCNC: 35.5 G/DL — SIGNIFICANT CHANGE UP (ref 32–37)
MCV RBC AUTO: 84.6 FL — SIGNIFICANT CHANGE UP (ref 80–94)
METHOD TYPE: SIGNIFICANT CHANGE UP
NRBC # BLD: 0 /100 WBCS — SIGNIFICANT CHANGE UP (ref 0–0)
ORGANISM # SPEC MICROSCOPIC CNT: SIGNIFICANT CHANGE UP
ORGANISM # SPEC MICROSCOPIC CNT: SIGNIFICANT CHANGE UP
PLATELET # BLD AUTO: 192 K/UL — SIGNIFICANT CHANGE UP (ref 130–400)
POTASSIUM SERPL-MCNC: 3.5 MMOL/L — SIGNIFICANT CHANGE UP (ref 3.5–5)
POTASSIUM SERPL-SCNC: 3.5 MMOL/L — SIGNIFICANT CHANGE UP (ref 3.5–5)
PROT SERPL-MCNC: 4.8 G/DL — LOW (ref 6–8)
PROT SERPL-MCNC: 5 G/DL — LOW (ref 6–8)
RBC # BLD: 5 M/UL — SIGNIFICANT CHANGE UP (ref 4.7–6.1)
RBC # FLD: 13.6 % — SIGNIFICANT CHANGE UP (ref 11.5–14.5)
SODIUM SERPL-SCNC: 143 MMOL/L — SIGNIFICANT CHANGE UP (ref 135–146)
SPECIMEN SOURCE: SIGNIFICANT CHANGE UP
WBC # BLD: 11.2 K/UL — HIGH (ref 4.8–10.8)
WBC # FLD AUTO: 11.2 K/UL — HIGH (ref 4.8–10.8)

## 2022-05-16 PROCEDURE — 99232 SBSQ HOSP IP/OBS MODERATE 35: CPT

## 2022-05-16 PROCEDURE — 76705 ECHO EXAM OF ABDOMEN: CPT | Mod: 26

## 2022-05-16 RX ORDER — IPRATROPIUM/ALBUTEROL SULFATE 18-103MCG
3 AEROSOL WITH ADAPTER (GRAM) INHALATION EVERY 6 HOURS
Refills: 0 | Status: DISCONTINUED | OUTPATIENT
Start: 2022-05-16 | End: 2022-05-16

## 2022-05-16 RX ORDER — METOPROLOL TARTRATE 50 MG
10 TABLET ORAL EVERY 6 HOURS
Refills: 0 | Status: DISCONTINUED | OUTPATIENT
Start: 2022-05-16 | End: 2022-05-16

## 2022-05-16 RX ORDER — IPRATROPIUM BROMIDE 0.2 MG/ML
500 SOLUTION, NON-ORAL INHALATION EVERY 6 HOURS
Refills: 0 | Status: DISCONTINUED | OUTPATIENT
Start: 2022-05-16 | End: 2022-05-18

## 2022-05-16 RX ORDER — AMLODIPINE BESYLATE 2.5 MG/1
5 TABLET ORAL DAILY
Refills: 0 | Status: DISCONTINUED | OUTPATIENT
Start: 2022-05-17 | End: 2022-05-19

## 2022-05-16 RX ORDER — AMLODIPINE BESYLATE 2.5 MG/1
2.5 TABLET ORAL ONCE
Refills: 0 | Status: COMPLETED | OUTPATIENT
Start: 2022-05-16 | End: 2022-05-16

## 2022-05-16 RX ORDER — METOPROLOL TARTRATE 50 MG
100 TABLET ORAL ONCE
Refills: 0 | Status: DISCONTINUED | OUTPATIENT
Start: 2022-05-16 | End: 2022-05-16

## 2022-05-16 RX ORDER — METOPROLOL TARTRATE 50 MG
10 TABLET ORAL EVERY 6 HOURS
Refills: 0 | Status: DISCONTINUED | OUTPATIENT
Start: 2022-05-16 | End: 2022-05-17

## 2022-05-16 RX ADMIN — CLOPIDOGREL BISULFATE 75 MILLIGRAM(S): 75 TABLET, FILM COATED ORAL at 13:04

## 2022-05-16 RX ADMIN — CEFEPIME 100 MILLIGRAM(S): 1 INJECTION, POWDER, FOR SOLUTION INTRAMUSCULAR; INTRAVENOUS at 13:04

## 2022-05-16 RX ADMIN — Medication 1.25 MILLIGRAM(S): at 22:40

## 2022-05-16 RX ADMIN — AMLODIPINE BESYLATE 2.5 MILLIGRAM(S): 2.5 TABLET ORAL at 22:14

## 2022-05-16 RX ADMIN — ENOXAPARIN SODIUM 40 MILLIGRAM(S): 100 INJECTION SUBCUTANEOUS at 10:01

## 2022-05-16 RX ADMIN — Medication 1.25 MILLIGRAM(S): at 16:25

## 2022-05-16 RX ADMIN — Medication 10 MILLIGRAM(S): at 07:13

## 2022-05-16 RX ADMIN — Medication 500 MICROGRAM(S): at 14:31

## 2022-05-16 RX ADMIN — DONEPEZIL HYDROCHLORIDE 5 MILLIGRAM(S): 10 TABLET, FILM COATED ORAL at 22:14

## 2022-05-16 RX ADMIN — Medication 100 MILLIGRAM(S): at 17:52

## 2022-05-16 RX ADMIN — SODIUM CHLORIDE 50 MILLILITER(S): 9 INJECTION INTRAMUSCULAR; INTRAVENOUS; SUBCUTANEOUS at 22:16

## 2022-05-16 RX ADMIN — SIMVASTATIN 40 MILLIGRAM(S): 20 TABLET, FILM COATED ORAL at 22:14

## 2022-05-16 NOTE — PROGRESS NOTE ADULT - ASSESSMENT
89 year old male PMH of HTN, HLD, DM, CAD, status post PC Alzheimer's dementia brought to the ED from the [facility] after being found on the floor of his apartment this morning. Will be admitted to SDU    #toxic metabolic encephalopathy 2/2 Sepsis secondary to acute cystitis -(WBC, tachy, lactate, source)  Rhabdomyolysis  -found this morning on the floor by staff at [facility]  -unknown what happened prior to being found, pt well the day before  -unable to acquire hx from pt as pt lethargic/obtunded  -meets sepsis criteria as wbc elevated at 15.84, tachy to 105 and grossly positive UA; lactate 3.4  -critical care following and will be admitted to SDU  -start cefepime 1g q24; BCx,UCx. ID f/u  -trend CK; monitor renal function    HTN  HLD  -will continue amlodipine 5  -hold valsartan 320 for now to avoid hypotension in the setting of sepsis  -continue simvastatin 40    Afib with rvr  -new onset ? on 5/16. nothing documented per chart  -did not get home metoprolol 100xr due to failed speech and swallow  -convert to metoprolol 10mg iv push q6hrs for now  -monitor off ac due to advanced age    Suspected COPD  -emphysematous changes on CT chest, pt on oxygen  -atrovent nebs q6hrs for now  -wean off o2 as tolerated, aim for pulse ox >88  -outpt pulm Follow up    CAD s/p PCI  -continue ASA/plavix    T2DM  -as per pts son, pt hasn't been on meds because glucose well controlled  -will obtain A1C    Alzheimers dementia  -continue donepezil    DVT ppx: lovenox  GI ppx: protonix  Activity: PT consult  Diet: speech and swallow consult  Dispo: SDU, acute  Code status: DNR/DNI       89 year old male PMH of HTN, HLD, DM, CAD, status post PC Alzheimer's dementia brought to the ED from the [facility] after being found on the floor of his apartment. admitted to SDU    #toxic metabolic encephalopathy 2/2 Sepsis secondary to acute cystitis -(WBC, tachy, lactate, source)  Rhabdomyolysis  -found on the floor by staff at [facility]  -unknown what happened prior to being found, pt well the day before  -unable to acquire hx from pt as pt lethargic/obtunded  -meets sepsis criteria as wbc elevated at 15.84, tachy to 105 and grossly positive UA; lactate 3.4  -critical care following and was admitted to SDU  -start cefepime 1g q24; BCx,UCx. ID f/u  -trend CK; monitor renal function    HTN  HLD  -will continue amlodipine 5  -hold valsartan 320 for now to avoid hypotension in the setting of sepsis  -continue simvastatin 40    Afib with rvr  -new onset ? on 5/16. nothing documented per chart  -did not get home metoprolol 100xr due to failed speech and swallow  -convert to metoprolol 10mg iv push q6hrs for now  -monitor off ac due to advanced age    Suspected COPD  -emphysematous changes on CT chest, pt on oxygen  -atrovent nebs q6hrs for now  -wean off o2 as tolerated, aim for pulse ox >88  -outpt pulm Follow up    CAD s/p PCI  -continue ASA/plavix    T2DM  -as per pts son, pt hasn't been on meds because glucose well controlled  -will obtain A1C    Alzheimers dementia  -continue donepezil    DVT ppx: lovenox  GI ppx: protonix  Activity: PT consult  Diet: speech and swallow consult  Dispo: SDU, acute  Code status: DNR/DNI

## 2022-05-16 NOTE — PROGRESS NOTE ADULT - SUBJECTIVE AND OBJECTIVE BOX
SUBJECTIVE:    Patient is a 89y old Male who presents with a chief complaint of Sepsis (15 May 2022 13:51)    Currently admitted to medicine with the primary diagnosis of Acute metabolic encephalopathy       Today is hospital day 3d. This morning he is resting comfortably in bed and reports no new issues or overnight events.     INTERVAL EVENTS:   afib with rvr this am did not get home metoprolol converted to IV    PAST MEDICAL & SURGICAL HISTORY  Alzheimer disease    Hypertension    Hyperlipidemia    Diabetes mellitus    Coronary artery disease    No significant past surgical history        ALLERGIES:  No Known Allergies    MEDICATIONS:  STANDING MEDICATIONS  allopurinol 100 milliGRAM(s) Oral two times a day  amLODIPine   Tablet 2.5 milliGRAM(s) Oral daily  cefepime   IVPB 1000 milliGRAM(s) IV Intermittent every 12 hours  clopidogrel Tablet 75 milliGRAM(s) Oral daily  donepezil 5 milliGRAM(s) Oral at bedtime  enoxaparin Injectable 40 milliGRAM(s) SubCutaneous every 24 hours  ipratropium    for Nebulization 500 MICROGram(s) Nebulizer every 6 hours  metoprolol tartrate Injectable 10 milliGRAM(s) IV Push every 6 hours  pantoprazole    Tablet 40 milliGRAM(s) Oral before breakfast  simvastatin 40 milliGRAM(s) Oral at bedtime  sodium chloride 0.9%. 1000 milliLiter(s) IV Continuous <Continuous>    PRN MEDICATIONS  hydrALAZINE Injectable 10 milliGRAM(s) IV Push every 6 hours PRN    VITALS:   T(F): 96.8  HR: 92  BP: 132/87  RR: 28  SpO2: 98%    LABS:                        15.0   11.20 )-----------( 192      ( 16 May 2022 05:32 )             42.3     -    143  |  106  |  20  ----------------------------<  99  3.5   |  17  |  0.9    Ca    8.4<L>      16 May 2022 05:32  Mg     1.9     -    TPro  4.8<L>  /  Alb  3.0<L>  /  TBili  2.9<H>  /  DBili  x   /  AST  24  /  ALT  18  /  AlkPhos  62  05-16      Urinalysis Basic - ( 14 May 2022 14:50 )    Color: Yellow / Appearance: Turbid / S.023 / pH: x  Gluc: x / Ketone: Small  / Bili: Negative / Urobili: <2 mg/dL   Blood: x / Protein: 100 mg/dL / Nitrite: Negative   Leuk Esterase: Large / RBC: 5 /HPF / WBC >720 /HPF   Sq Epi: x / Non Sq Epi: 0 /HPF / Bacteria: Negative        Lactate, Blood: 1.7 mmol/L (22 @ 05:32)  Creatine Kinase, Serum: 370 U/L *H* (22 @ 05:32)      Culture - Blood (collected 13 May 2022 16:43)  Source: .Blood None  Preliminary Report (14 May 2022 23:02):    No growth to date.    Culture - Blood (collected 13 May 2022 10:42)  Source: .Blood Blood  Preliminary Report (14 May 2022 23:02):    No growth to date.    Culture - Blood (collected 13 May 2022 10:42)  Source: .Blood Blood  Preliminary Report (14 May 2022 23:02):    No growth to date.      CARDIAC MARKERS ( 16 May 2022 05:32 )  x     / x     / 370 U/L / x     / x      CARDIAC MARKERS ( 15 May 2022 05:19 )  x     / x     / 640 U/L / x     / x      CARDIAC MARKERS ( 14 May 2022 17:36 )  x     / 0.03 ng/mL / x     / x     / x          RADIOLOGY:    PHYSICAL EXAM:  GEN: No acute distress  PULM/CHEST: Clear to auscultation bilaterally, no rales, rhonchi or wheezes   CVS: Regular rate and rhythm, S1-S2, no murmurs  ABD: Soft, non-tender, non-distended, +BS  EXT: No edema  NEURO: AAOx1    Lagunas Catheter:   Indwelling Urethral Catheter:     Connect To:  Straight Drainage/Gravity    Indication:  Urine Output Monitoring in Critically Ill (22 @ 02:32) (not performed) [Active]

## 2022-05-16 NOTE — PHYSICAL THERAPY INITIAL EVALUATION ADULT - SPECIFY REASON(S)
PT on hold, pt lethargic, with elevated HR (120s) and A-Fib on EKG, DEO Beard aware. PT to f/u when pt is medically appropriate.

## 2022-05-16 NOTE — PROGRESS NOTE ADULT - ATTENDING COMMENTS
Patient seen at bedside. Changes made within note as well. Discussed with medical team that includes resident(s), medical student(s), nursing staff, case management.  89 year old male PMH of HTN, HLD, DM, CAD, status post PC Alzheimer's dementia brought to the ED from the [facility] after being found on the floor of his apartment. admitted to SDU.  Patient still has some leucocytosis. Currently on Cefepime.  now bilirubin is also elevated. GI consulted. afib with RVR on monitor? ordered EKG. May need to consult cardiology. follow GI recommendations. Follow EKG.

## 2022-05-17 LAB
ALBUMIN SERPL ELPH-MCNC: 3.4 G/DL — LOW (ref 3.5–5.2)
ALP SERPL-CCNC: 67 U/L — SIGNIFICANT CHANGE UP (ref 30–115)
ALT FLD-CCNC: 20 U/L — SIGNIFICANT CHANGE UP (ref 0–41)
ANION GAP SERPL CALC-SCNC: 16 MMOL/L — HIGH (ref 7–14)
ANION GAP SERPL CALC-SCNC: 25 MMOL/L — HIGH (ref 7–14)
AST SERPL-CCNC: 30 U/L — SIGNIFICANT CHANGE UP (ref 0–41)
B-OH-BUTYR SERPL-SCNC: 1.5 MMOL/L — HIGH
BASOPHILS # BLD AUTO: 0.04 K/UL — SIGNIFICANT CHANGE UP (ref 0–0.2)
BASOPHILS NFR BLD AUTO: 0.3 % — SIGNIFICANT CHANGE UP (ref 0–1)
BILIRUB SERPL-MCNC: 3 MG/DL — HIGH (ref 0.2–1.2)
BUN SERPL-MCNC: 22 MG/DL — HIGH (ref 10–20)
BUN SERPL-MCNC: 22 MG/DL — HIGH (ref 10–20)
CALCIUM SERPL-MCNC: 8.7 MG/DL — SIGNIFICANT CHANGE UP (ref 8.5–10.1)
CALCIUM SERPL-MCNC: 8.9 MG/DL — SIGNIFICANT CHANGE UP (ref 8.5–10.1)
CHLORIDE SERPL-SCNC: 106 MMOL/L — SIGNIFICANT CHANGE UP (ref 98–110)
CHLORIDE SERPL-SCNC: 108 MMOL/L — SIGNIFICANT CHANGE UP (ref 98–110)
CK SERPL-CCNC: 317 U/L — HIGH (ref 0–225)
CO2 SERPL-SCNC: 12 MMOL/L — LOW (ref 17–32)
CO2 SERPL-SCNC: 19 MMOL/L — SIGNIFICANT CHANGE UP (ref 17–32)
CREAT SERPL-MCNC: 0.8 MG/DL — SIGNIFICANT CHANGE UP (ref 0.7–1.5)
CREAT SERPL-MCNC: 0.8 MG/DL — SIGNIFICANT CHANGE UP (ref 0.7–1.5)
EGFR: 82 ML/MIN/1.73M2 — SIGNIFICANT CHANGE UP
EGFR: 82 ML/MIN/1.73M2 — SIGNIFICANT CHANGE UP
EOSINOPHIL # BLD AUTO: 0.14 K/UL — SIGNIFICANT CHANGE UP (ref 0–0.7)
EOSINOPHIL NFR BLD AUTO: 1.2 % — SIGNIFICANT CHANGE UP (ref 0–8)
GLUCOSE SERPL-MCNC: 113 MG/DL — HIGH (ref 70–99)
GLUCOSE SERPL-MCNC: 158 MG/DL — HIGH (ref 70–99)
HCT VFR BLD CALC: 49.6 % — SIGNIFICANT CHANGE UP (ref 42–52)
HGB BLD-MCNC: 17.4 G/DL — SIGNIFICANT CHANGE UP (ref 14–18)
IMM GRANULOCYTES NFR BLD AUTO: 0.7 % — HIGH (ref 0.1–0.3)
LACTATE SERPL-SCNC: 1.7 MMOL/L — SIGNIFICANT CHANGE UP (ref 0.7–2)
LACTATE SERPL-SCNC: 1.9 MMOL/L — SIGNIFICANT CHANGE UP (ref 0.7–2)
LYMPHOCYTES # BLD AUTO: 0.6 K/UL — LOW (ref 1.2–3.4)
LYMPHOCYTES # BLD AUTO: 5.2 % — LOW (ref 20.5–51.1)
MAGNESIUM SERPL-MCNC: 1.8 MG/DL — SIGNIFICANT CHANGE UP (ref 1.8–2.4)
MCHC RBC-ENTMCNC: 30.2 PG — SIGNIFICANT CHANGE UP (ref 27–31)
MCHC RBC-ENTMCNC: 35.1 G/DL — SIGNIFICANT CHANGE UP (ref 32–37)
MCV RBC AUTO: 86.1 FL — SIGNIFICANT CHANGE UP (ref 80–94)
MONOCYTES # BLD AUTO: 1.13 K/UL — HIGH (ref 0.1–0.6)
MONOCYTES NFR BLD AUTO: 9.8 % — HIGH (ref 1.7–9.3)
NEUTROPHILS # BLD AUTO: 9.57 K/UL — HIGH (ref 1.4–6.5)
NEUTROPHILS NFR BLD AUTO: 82.8 % — HIGH (ref 42.2–75.2)
NRBC # BLD: 0 /100 WBCS — SIGNIFICANT CHANGE UP (ref 0–0)
PHOSPHATE SERPL-MCNC: 2.9 MG/DL — SIGNIFICANT CHANGE UP (ref 2.1–4.9)
PLATELET # BLD AUTO: 195 K/UL — SIGNIFICANT CHANGE UP (ref 130–400)
POTASSIUM SERPL-MCNC: 3.2 MMOL/L — LOW (ref 3.5–5)
POTASSIUM SERPL-MCNC: 3.8 MMOL/L — SIGNIFICANT CHANGE UP (ref 3.5–5)
POTASSIUM SERPL-SCNC: 3.2 MMOL/L — LOW (ref 3.5–5)
POTASSIUM SERPL-SCNC: 3.8 MMOL/L — SIGNIFICANT CHANGE UP (ref 3.5–5)
PROT SERPL-MCNC: 5.6 G/DL — LOW (ref 6–8)
RBC # BLD: 5.76 M/UL — SIGNIFICANT CHANGE UP (ref 4.7–6.1)
RBC # FLD: 13.9 % — SIGNIFICANT CHANGE UP (ref 11.5–14.5)
SODIUM SERPL-SCNC: 143 MMOL/L — SIGNIFICANT CHANGE UP (ref 135–146)
SODIUM SERPL-SCNC: 143 MMOL/L — SIGNIFICANT CHANGE UP (ref 135–146)
WBC # BLD: 11.56 K/UL — HIGH (ref 4.8–10.8)
WBC # FLD AUTO: 11.56 K/UL — HIGH (ref 4.8–10.8)

## 2022-05-17 PROCEDURE — 99233 SBSQ HOSP IP/OBS HIGH 50: CPT

## 2022-05-17 RX ORDER — METOPROLOL TARTRATE 50 MG
50 TABLET ORAL
Refills: 0 | Status: DISCONTINUED | OUTPATIENT
Start: 2022-05-17 | End: 2022-05-19

## 2022-05-17 RX ORDER — METOPROLOL TARTRATE 50 MG
5 TABLET ORAL EVERY 6 HOURS
Refills: 0 | Status: DISCONTINUED | OUTPATIENT
Start: 2022-05-17 | End: 2022-05-17

## 2022-05-17 RX ORDER — LISINOPRIL 2.5 MG/1
10 TABLET ORAL DAILY
Refills: 0 | Status: DISCONTINUED | OUTPATIENT
Start: 2022-05-17 | End: 2022-05-19

## 2022-05-17 RX ORDER — CEFTRIAXONE 500 MG/1
1 INJECTION, POWDER, FOR SOLUTION INTRAMUSCULAR; INTRAVENOUS EVERY 24 HOURS
Refills: 0 | Status: DISCONTINUED | OUTPATIENT
Start: 2022-05-17 | End: 2022-05-18

## 2022-05-17 RX ORDER — POTASSIUM CHLORIDE 20 MEQ
40 PACKET (EA) ORAL ONCE
Refills: 0 | Status: COMPLETED | OUTPATIENT
Start: 2022-05-17 | End: 2022-05-17

## 2022-05-17 RX ADMIN — CLOPIDOGREL BISULFATE 75 MILLIGRAM(S): 75 TABLET, FILM COATED ORAL at 13:24

## 2022-05-17 RX ADMIN — CEFEPIME 100 MILLIGRAM(S): 1 INJECTION, POWDER, FOR SOLUTION INTRAMUSCULAR; INTRAVENOUS at 13:24

## 2022-05-17 RX ADMIN — Medication 100 MILLIGRAM(S): at 05:40

## 2022-05-17 RX ADMIN — LISINOPRIL 10 MILLIGRAM(S): 2.5 TABLET ORAL at 09:31

## 2022-05-17 RX ADMIN — CEFEPIME 100 MILLIGRAM(S): 1 INJECTION, POWDER, FOR SOLUTION INTRAMUSCULAR; INTRAVENOUS at 02:00

## 2022-05-17 RX ADMIN — ENOXAPARIN SODIUM 40 MILLIGRAM(S): 100 INJECTION SUBCUTANEOUS at 05:40

## 2022-05-17 RX ADMIN — Medication 50 MILLIGRAM(S): at 17:57

## 2022-05-17 RX ADMIN — Medication 50 MILLIGRAM(S): at 09:31

## 2022-05-17 RX ADMIN — SODIUM CHLORIDE 50 MILLILITER(S): 9 INJECTION INTRAMUSCULAR; INTRAVENOUS; SUBCUTANEOUS at 07:30

## 2022-05-17 RX ADMIN — SIMVASTATIN 40 MILLIGRAM(S): 20 TABLET, FILM COATED ORAL at 21:21

## 2022-05-17 RX ADMIN — AMLODIPINE BESYLATE 5 MILLIGRAM(S): 2.5 TABLET ORAL at 05:40

## 2022-05-17 RX ADMIN — Medication 500 MICROGRAM(S): at 20:46

## 2022-05-17 RX ADMIN — DONEPEZIL HYDROCHLORIDE 5 MILLIGRAM(S): 10 TABLET, FILM COATED ORAL at 21:21

## 2022-05-17 RX ADMIN — Medication 40 MILLIEQUIVALENT(S): at 14:25

## 2022-05-17 RX ADMIN — PANTOPRAZOLE SODIUM 40 MILLIGRAM(S): 20 TABLET, DELAYED RELEASE ORAL at 05:41

## 2022-05-17 RX ADMIN — Medication 100 MILLIGRAM(S): at 17:55

## 2022-05-17 NOTE — PROGRESS NOTE ADULT - SUBJECTIVE AND OBJECTIVE BOX
CC: 89 year old male PMH of HTN, HLD, DM, CAD status post PCI, Alzheimer's dementia brought to the ED from the [facility] after being found on the floor of his apartment this morning. Pt was found this morning on the floor by staff minimally responsive and unknown what happened prior to him being found on the floor. Pt was well the day prior. Unable to give story at the time of my assessment as pt appears very confused.   Tests:   -trauma w/u - +distal L clavicular fx  -echo - 57%, G1DD, mild AS  - UA - 171 wbc, bacteria, LE  - UCX- E. Coli. sensitivities noted  - bx neg  Consults: ID, Ortho   Interventions: antibiotics   DCP:  likely SNF vs [facility] based on PT  Code: DNR/ DNI    Hospital day # : 4    Events Overnight/VS: no sig. events    Subjective: confused    Physical Exam:  General: no acute distress  HEENT: atraumatic, normocephalic  Chest: RRR, S1/S2  Lungs: Clear to auscultation bilaterally, no wheeze  Abd: BS audible, soft, non-tender  Ext: no clubbing, cyanosis, no edema  Skin: warm and dry  Neuro/MSK: awake, confused, verbal responds to certain questions, oriented x0-1    New Labs/Imaging/Procedures/Results:                        17.4   11.56 )-----------( 195      ( 17 May 2022 05:59 )             49.6                         15.0   11.20 )-----------( 192      ( 16 May 2022 05:32 )             42.3       17 May 2022 12:17    143    |  108    |  22     ----------------------------<  158    3.2     |  19     |  0.8    17 May 2022 05:59    143    |  106    |  22     ----------------------------<  113    3.8     |  12     |  0.8    16 May 2022 05:32    143    |  106    |  20     ----------------------------<  99     3.5     |  17     |  0.9       Ca    8.7        17 May 2022 12:17  Ca    8.9        17 May 2022 05:59  Ca    8.4        16 May 2022 05:32  Phos  2.9       17 May 2022 05:59  Mg     1.8       17 May 2022 05:59  Mg     1.9       16 May 2022 05:32      Assessment/Plan:    #Toxic metabolic encephalopathy secondary to E.Coli cystitis  #Sepsis POA - resolved  on admission: wbc elevated at 15.84, tachy to 105 and grossly positive UA; lactate 3.4  found on the floor by staff at [facility], unknown what happened prior to being found, pt well the day before, unable to acquire hx from pt as pt lethargic/obtunded  REMAINS altered, baseline is AAOx2 per son (able to know person and year and sometimes day)  -on Cefepime can de-escalate as sensitive to multiple antibiotics  -ID following  -feed per s/s  -PT    #S/p Fall/ Left clavicular fx  -ortho- no intervention, NWB LUE, sling, pain control  -follow up w/ Dr. West. please call [phone] to schedule an appointment;     #Rhabdomyolysis - resolved  -CK 2000s->300  -encourage PO intake  -c/w PT    #?Paroxysmal Afib with RVR   new onset on 5/16, however no EKG documentation. did not get home metoprolol 100xr due to failed speech and swallow  -resumed PO metoprolol 50 bid  -monitor off ac due to advanced age    #CAD s/p PCI  #HTN  #HLD  -continue ASA/plavix/statin/BB  -c/w amlodipine 5, lisinopril 10 and restarted home metoprolol 50 bid (on 100 XR however cannot be crushed)    #Suspected COPD  emphysematous changes on CT chest  now on room air  -c/w atrovent nebs q6hrs prn for now  -outpt pulm Follow up    #T2DM  as per pts son, pt hasn't been on meds because glucose well controlled  A1c 7.3  -monitor FG, start insulin regimen if needed    #Alzheimer dementia  mild per son, pt aware of situation and fully functional  -c/w donepezil    DVT ppx: lovenox  Activity: PT eval pending  Diet: puree w/ mild thick liquids per s/s  Code status: DNR/DNI    #Provider handoff- ams, feeds, PT eval CC: 89 year old male PMH of HTN, HLD, DM, CAD status post PCI, Alzheimer's dementia brought to the ED from the [facility] after being found on the floor of his apartment this morning. Pt was found this morning on the floor by staff minimally responsive and unknown what happened prior to him being found on the floor. Pt was well the day prior. Unable to give story at the time of my assessment as pt appears very confused.   Tests:   -trauma w/u - +distal L clavicular fx  -echo - 57%, G1DD, mild AS  - UA - 171 wbc, bacteria, LE  - UCX- E. Coli. sensitivities noted  - bx neg  Consults: ID, Ortho   Interventions: antibiotics   [facility]  [facility]  Code: DNR/ DNI    Hospital day # : 4    Events Overnight/VS: no sig. events    Subjective: confused    Physical Exam:  General: no acute distress  HEENT: atraumatic, normocephalic  Chest: RRR, S1/S2  Lungs: Clear to auscultation bilaterally, no wheeze  Abd: BS audible, soft, non-tender  Ext: no clubbing, cyanosis, no edema  Skin: warm and dry  Neuro/MSK: awake, confused, verbal responds to certain questions, oriented x0-1    New Labs/Imaging/Procedures/Results:                        17.4   11.56 )-----------( 195      ( 17 May 2022 05:59 )             49.6                         15.0   11.20 )-----------( 192      ( 16 May 2022 05:32 )             42.3       17 May 2022 12:17    143    |  108    |  22     ----------------------------<  158    3.2     |  19     |  0.8    17 May 2022 05:59    143    |  106    |  22     ----------------------------<  113    3.8     |  12     |  0.8    16 May 2022 05:32    143    |  106    |  20     ----------------------------<  99     3.5     |  17     |  0.9       Ca    8.7        17 May 2022 12:17  Ca    8.9        17 May 2022 05:59  Ca    8.4        16 May 2022 05:32  Phos  2.9       17 May 2022 05:59  Mg     1.8       17 May 2022 05:59  Mg     1.9       16 May 2022 05:32      Assessment/Plan:    #Toxic metabolic encephalopathy secondary to E.Coli cystitis  #Sepsis POA - resolved  #Dysphagia  on admission: wbc elevated at 15.84, tachy to 105 and grossly positive UA; lactate 3.4  found on the floor by staff at [facility], unknown what happened prior to being found, pt well the day before, unable to acquire hx from pt as pt lethargic/obtunded  REMAINS altered, baseline is AAOx2 per son (able to know person and year and sometimes day)  -on Cefepime can de-escalate as sensitive to multiple antibiotics  -ID following  -pureed diet, 1 to 1 feeds per s/s    #S/p Fall/ Left clavicular fx  -ortho- no intervention, NWB LUE, sling, pain control  -follow up w/ Dr. West. please call [phone] to schedule an appointment;     #Rhabdomyolysis - resolved  -CK 2000s->300  -encourage PO intake  -c/w PT    #Elevated TB Indirect predominant - stable   RUQ US with mild gallbladder sludge  LFTs WNL  - trend for now    #?Paroxysmal Afib with RVR   new onset on 5/16, however no EKG documentation. did not get home metoprolol 100xr due to failed speech and swallow  -resumed PO metoprolol 50 bid  -monitor off ac due to advanced age    #CAD s/p PCI  #HTN  #HLD  -continue ASA/plavix/statin/BB  -c/w amlodipine 5, lisinopril 10 and restarted home metoprolol 50 bid (on 100 XR however cannot be crushed)    #T2DM  as per pts son, pt hasn't been on meds because glucose well controlled  A1c 7.3  -monitor FG, start insulin regimen if needed    #Alzheimer dementia  mild per son, pt aware of situation and fully functional  -c/w donepezil    DVT ppx: lovenox  Activity: PT eval pending  Diet: puree w/ mild thick liquids per s/s  Code status: DNR/DNI    #Provider handoff- ams, feeds, PT eval CC: 89 year old male PMH of HTN, HLD, DM, CAD status post PCI, Alzheimer's dementia brought to the ED from the [facility] after being found on the floor of his apartment this morning. Pt was found this morning on the floor by staff minimally responsive and unknown what happened prior to him being found on the floor. Pt was well the day prior. Unable to give story at the time of my assessment as pt appears very confused.   Tests:   -trauma w/u - +distal L clavicular fx  -echo - 57%, G1DD, mild AS  - UA - 171 wbc, bacteria, LE  - UCX- E. Coli. sensitivities noted  - bx neg  Consults: ID, Ortho   Interventions: antibiotics   [facility]  [facility]  Code: DNR/ DNI    Hospital day # : 4    Events Overnight/VS: no sig. events    Subjective: confused    Physical Exam:  General: no acute distress  HEENT: atraumatic, normocephalic  Chest: RRR, S1/S2  Lungs: Clear to auscultation bilaterally, no wheeze  Abd: BS audible, soft, non-tender  Ext: no clubbing, cyanosis, no edema  Skin: warm and dry  Neuro/MSK: awake, confused, verbal responds to certain questions, oriented x0-1    New Labs/Imaging/Procedures/Results:                        17.4   11.56 )-----------( 195      ( 17 May 2022 05:59 )             49.6                         15.0   11.20 )-----------( 192      ( 16 May 2022 05:32 )             42.3       17 May 2022 12:17    143    |  108    |  22     ----------------------------<  158    3.2     |  19     |  0.8    17 May 2022 05:59    143    |  106    |  22     ----------------------------<  113    3.8     |  12     |  0.8    16 May 2022 05:32    143    |  106    |  20     ----------------------------<  99     3.5     |  17     |  0.9       Ca    8.7        17 May 2022 12:17  Ca    8.9        17 May 2022 05:59  Ca    8.4        16 May 2022 05:32  Phos  2.9       17 May 2022 05:59  Mg     1.8       17 May 2022 05:59  Mg     1.9       16 May 2022 05:32      Assessment/Plan:    #Toxic metabolic encephalopathy secondary to E.Coli cystitis  #Sepsis POA - resolved  #Dysphagia  on admission: wbc elevated at 15.84, tachy to 105 and grossly positive UA; lactate 3.4  found on the floor by staff at [facility], unknown what happened prior to being found, pt well the day before, unable to acquire hx from pt as pt lethargic/obtunded  REMAINS altered, baseline is AAOx2 per son (able to know person and year and sometimes day)  -on Cefepime, de-escalate to Rocephin as sensitive, also sensitive to fluoroquinolones and bactrim can do Vantin  (today is day 5 of abx)  -ID rec appreciated  -pureed diet, 1 to 1 feeds per s/s    #S/p Fall/ Left clavicular fx  -ortho- no intervention, NWB LUE, sling, pain control  -follow up w/ Dr. West. please call [phone] to schedule an appointment;     #Rhabdomyolysis - resolved  -CK 2000s->300  -encourage PO intake  -c/w PT    #Elevated TB Indirect predominant - stable   RUQ US with mild gallbladder sludge  LFTs WNL  - trend for now    #?Paroxysmal Afib with RVR   new onset on 5/16, however no EKG documentation. did not get home metoprolol 100xr due to failed speech and swallow  -resumed PO metoprolol 50 bid  -monitor off ac due to advanced age    #CAD s/p PCI  #HTN  #HLD  -continue ASA/plavix/statin/BB  -c/w amlodipine 5, lisinopril 10 and restarted home metoprolol 50 bid (on 100 XR however cannot be crushed)    #T2DM  as per pts son, pt hasn't been on meds because glucose well controlled  A1c 7.3  -monitor FG, start insulin regimen if needed    #Alzheimer dementia  mild per son, pt aware of situation and fully functional  -c/w donepezil    DVT ppx: lovenox  Activity: PT eval pending  Diet: puree w/ mild thick liquids per s/s  Code status: DNR/DNI    #Provider handoff- ams, advance feeds, PT

## 2022-05-17 NOTE — SWALLOW BEDSIDE ASSESSMENT ADULT - ORAL PREPARATORY PHASE
Reduced oral grading Within functional limits no initiation of chewing, bolus removed from oral cavity/Decreased mastication ability

## 2022-05-17 NOTE — PROGRESS NOTE ADULT - SUBJECTIVE AND OBJECTIVE BOX
AC JACKSON  89y Male    INTERVAL HPI/OVERNIGHT EVENTS:    Pt is confused and restless in bed, hallucinating.  Son at bedside and states that this is not his baseline  He resides at assisted living and is oriented x3 usually.  no fever, not answering questions  unable to obtain ROS due to altered mental status    T(F): 97.5 (05-17-22 @ 05:00), Max: 97.5 (05-17-22 @ 05:00)  HR: 87 (05-17-22 @ 09:29) (73 - 96)  BP: 178/86 (05-17-22 @ 09:29) (135/94 - 196/95)  RR: 20 (05-17-22 @ 05:00) (18 - 43)  SpO2: 95% (05-16-22 @ 22:49) (94% - 96%) on RA    I&O's Summary    16 May 2022 07:01  -  17 May 2022 07:00  --------------------------------------------------------  IN: 200 mL / OUT: 1250 mL / NET: -1050 mL      PHYSICAL EXAM:  GENERAL: NAD  HEAD:  Normocephalic  EYES:  conjunctiva and sclera clear  ENMT: Moist mucous membranes  NERVOUS SYSTEM:  Alert, awake, confused, oriented x 1  restless in bed, moves all extremities  CHEST/LUNG: CTA b/l  HEART: Regular rate and rhythm  ABDOMEN: Soft, Nontender, Nondistended; Bowel sounds present  EXTREMITIES:   No edema  SKIN: right arm hematoma, right knee slightly warmer, no erythema   + gerardo    Consultant(s) Notes Reviewed:  [x ] YES  [ ] NO  Care Discussed with Consultants/Other Providers [ x] YES  [ ] NO    MEDICATIONS  (STANDING):  allopurinol 100 milliGRAM(s) Oral two times a day  amLODIPine   Tablet 5 milliGRAM(s) Oral daily  cefepime   IVPB 1000 milliGRAM(s) IV Intermittent every 12 hours  clopidogrel Tablet 75 milliGRAM(s) Oral daily  donepezil 5 milliGRAM(s) Oral at bedtime  enoxaparin Injectable 40 milliGRAM(s) SubCutaneous every 24 hours  ipratropium    for Nebulization 500 MICROGram(s) Nebulizer every 6 hours  lisinopril 10 milliGRAM(s) Oral daily  metoprolol tartrate 50 milliGRAM(s) Oral two times a day  pantoprazole    Tablet 40 milliGRAM(s) Oral before breakfast  simvastatin 40 milliGRAM(s) Oral at bedtime  sodium chloride 0.9%. 1000 milliLiter(s) (50 mL/Hr) IV Continuous <Continuous>    MEDICATIONS  (PRN):      LABS:                        17.4   11.56 )-----------( 195      ( 17 May 2022 05:59 )             49.6     05-17    143  |  106  |  22<H>  ----------------------------<  113<H>  3.8   |  12<L>  |  0.8    Ca    8.9      17 May 2022 05:59  Phos  2.9     05-17  Mg     1.8     05-17    TPro  5.6<L>  /  Alb  3.4<L>  /  TBili  3.0<H>  /  DBili  x   /  AST  30  /  ALT  20  /  AlkPhos  67  05-17          RADIOLOGY & ADDITIONAL TESTS:    Imaging or report Personally Reviewed:  [ x] YES  [ ] NO    < from: US Abdomen Upper Quadrant Right (05.16.22 @ 04:04) >  IMPRESSION:  Mild gallbladder sludge. No sonographic evidence of acute cholecystitis.   Remainder of the visualized anatomy is normal.    < end of copied text >      < from: Xray Chest 1 View- PORTABLE-Urgent (Xray Chest 1 View- PORTABLE-Urgent .) (05.15.22 @ 10:35) >  Impression:    Emphysematous changes.    < end of copied text >      < from: Xray Shoulder 2 Views, Left (05.14.22 @ 10:50) >  FINDINGS/  impression: Diffuse osteopenia. No definite acute displaced fracture or   dislocation. Ossifications at the region of the coracoclavicular ligament   consistent with sequela of remote ligamentous injury.    Moderate degenerative change of the glenohumeral and AC joints. Partially   visualized fibrotic changes of the lungs.    < end of copied text >      < from: CT Abdomen and Pelvis No Cont (05.13.22 @ 10:14) >  IMPRESSION:    No CT evidence of acute intrathoracic or intra-abdominal pathology.    Moderate to severe emphysema.    Enlarged prostate gland contains brachytherapy seeds.    Moderate-sized hiatal hernia.    Fragmentation of the lateral aspect of the left clavicle which could be   posttraumatic. Cannot exclude a lytic lesion. Clinical correlation is   advised.    Mild likely chronic compression fracture of the L1 vertebral body.    < end of copied text >      < from: TTE Echo Complete w/o Contrast w/ Doppler (05.14.22 @ 10:19) >    Summary:   1. Normal global left ventricular systolic function.   2. LV Ejection Fraction by Landry's Method with a biplane EF of 57 %.   3. Normal left ventricular internal cavity size.   4. Spectral Doppler shows impaired relaxation pattern of left   ventricular myocardial filling (Grade I diastolic dysfunction).   5. Moderately enlarged left atrium.   6. Normal right atrial size.   7. There is no evidence of pericardial effusion.   8. Mild thickening of the anterior and posterior mitral valve leaflets.   9. No evidence of mitral valve regurgitation.  10. Peak transaortic gradient equals 13.1 mmHg, mean transaortic gradient   equals 7.2 mmHg, the calculated aortic valve area equals 1.83 cm² by the   continuity equation consistent with mild aortic stenosis.      < end of copied text >      Case discussed with residents and RN on rounds today    Care discussed with pt's family

## 2022-05-17 NOTE — PROGRESS NOTE ADULT - ASSESSMENT
89 y/o man with PMH of HTN, hyperlipidemia, DM type 2, CAD s/p PCI and Alzheimer's dementia was brought to the ED from the [facility] (assisted living [facility]) after being found on the floor of his apartment. He was admitted to SDU and is now downgraded to the medical floor.    1. Severe Sepsis present on admission due to acute cystitis  Urine culture: E. coli sensitive to multiple tested abx  blood cultures negative  leukocytosis improving  on cefepime - can de-escalate abx  ID following  TOV today    2. Altered mental status due to toxic metabolic encephalopathy likely due to acute cystitis  pt with Alzheimer's dementia on donepezil  pt remains altered and confused per son  reorient frequently, fall precautions  family involved  posey for safety    3. s/p fall with rhabdomyolysis  found on the floor by staff at [facility]  CK level in the 2000 range initially and now 300  encourage PO hydration  PT consulted    4. ?episode of Afib with RVR per chart - no strip available for review - likely due to sepsis  pt in regular rhythm initially and on exam today  check TSH level  continue metoprolol  ECHO report reviewed: EF 57%, grade 1 DD, moderately enlarged LA, mild AS  if recurs, cardiology eval  agree with holding anticoagulation for now    5. Elevated TB (indirect>>direct) - stable - RUQ US with mild gallbladder sludge  other LFTs WNL -> trend for now    6. Dysphagia - pureed diet, 1 to 1 feeds    7. HTN - restarted on metoprolol 50mg q12hr (on toprol 100mg at home but unable to take now - needs meds to be crushed for now)  on amlodipine and lisinopril monitor BP    8. CAD s/p PCI - on Plavix, statin, metoprolol, lisinopril    9. DM type 2 - per chart, not on meds per son because FS are well controlled  A1C 7.3  check FS qAC and qHS -> if stable, then check FS less frequently    10. DVT ppx: lovenox    11. Bicarbonate 12 today- rule out true value vs lab error  repeat BMP and check ketones and lactate level      Code status: DNR/DNI      PROGRESS NOTE HANDOFF    Pending: BMP, BHB and lactate at 11 AM, labs in AM, improvement in mental status, PT f/u    Family discussion: with son at bedside today    Disposition: [facility] vs [facility] depending on progress with PT

## 2022-05-17 NOTE — SWALLOW BEDSIDE ASSESSMENT ADULT - SWALLOW EVAL: FUNCTIONAL LEVEL AT TIME OF EVAL
Lethargic, non verbal, unable to maintain arousal, unable to follow commands.
confused, not at baseline mental status as per son

## 2022-05-18 LAB
ALBUMIN SERPL ELPH-MCNC: 3.1 G/DL — LOW (ref 3.5–5.2)
ALP SERPL-CCNC: 62 U/L — SIGNIFICANT CHANGE UP (ref 30–115)
ALT FLD-CCNC: 18 U/L — SIGNIFICANT CHANGE UP (ref 0–41)
ANION GAP SERPL CALC-SCNC: 17 MMOL/L — HIGH (ref 7–14)
AST SERPL-CCNC: 18 U/L — SIGNIFICANT CHANGE UP (ref 0–41)
BASOPHILS # BLD AUTO: 0.04 K/UL — SIGNIFICANT CHANGE UP (ref 0–0.2)
BASOPHILS NFR BLD AUTO: 0.3 % — SIGNIFICANT CHANGE UP (ref 0–1)
BILIRUB SERPL-MCNC: 2.1 MG/DL — HIGH (ref 0.2–1.2)
BUN SERPL-MCNC: 21 MG/DL — HIGH (ref 10–20)
CALCIUM SERPL-MCNC: 8.3 MG/DL — LOW (ref 8.5–10.1)
CHLORIDE SERPL-SCNC: 110 MMOL/L — SIGNIFICANT CHANGE UP (ref 98–110)
CO2 SERPL-SCNC: 19 MMOL/L — SIGNIFICANT CHANGE UP (ref 17–32)
CREAT SERPL-MCNC: 0.7 MG/DL — SIGNIFICANT CHANGE UP (ref 0.7–1.5)
CULTURE RESULTS: SIGNIFICANT CHANGE UP
EGFR: 85 ML/MIN/1.73M2 — SIGNIFICANT CHANGE UP
EOSINOPHIL # BLD AUTO: 0.34 K/UL — SIGNIFICANT CHANGE UP (ref 0–0.7)
EOSINOPHIL NFR BLD AUTO: 2.8 % — SIGNIFICANT CHANGE UP (ref 0–8)
GLUCOSE BLDC GLUCOMTR-MCNC: 132 MG/DL — HIGH (ref 70–99)
GLUCOSE BLDC GLUCOMTR-MCNC: 162 MG/DL — HIGH (ref 70–99)
GLUCOSE BLDC GLUCOMTR-MCNC: 207 MG/DL — HIGH (ref 70–99)
GLUCOSE BLDC GLUCOMTR-MCNC: 212 MG/DL — HIGH (ref 70–99)
GLUCOSE SERPL-MCNC: 147 MG/DL — HIGH (ref 70–99)
HCT VFR BLD CALC: 44.6 % — SIGNIFICANT CHANGE UP (ref 42–52)
HGB BLD-MCNC: 16 G/DL — SIGNIFICANT CHANGE UP (ref 14–18)
IMM GRANULOCYTES NFR BLD AUTO: 0.7 % — HIGH (ref 0.1–0.3)
LYMPHOCYTES # BLD AUTO: 0.58 K/UL — LOW (ref 1.2–3.4)
LYMPHOCYTES # BLD AUTO: 4.8 % — LOW (ref 20.5–51.1)
MAGNESIUM SERPL-MCNC: 1.6 MG/DL — LOW (ref 1.8–2.4)
MCHC RBC-ENTMCNC: 30.2 PG — SIGNIFICANT CHANGE UP (ref 27–31)
MCHC RBC-ENTMCNC: 35.9 G/DL — SIGNIFICANT CHANGE UP (ref 32–37)
MCV RBC AUTO: 84.3 FL — SIGNIFICANT CHANGE UP (ref 80–94)
MONOCYTES # BLD AUTO: 1.21 K/UL — HIGH (ref 0.1–0.6)
MONOCYTES NFR BLD AUTO: 10 % — HIGH (ref 1.7–9.3)
NEUTROPHILS # BLD AUTO: 9.81 K/UL — HIGH (ref 1.4–6.5)
NEUTROPHILS NFR BLD AUTO: 81.4 % — HIGH (ref 42.2–75.2)
NRBC # BLD: 0 /100 WBCS — SIGNIFICANT CHANGE UP (ref 0–0)
PLATELET # BLD AUTO: 217 K/UL — SIGNIFICANT CHANGE UP (ref 130–400)
POTASSIUM SERPL-MCNC: 3.2 MMOL/L — LOW (ref 3.5–5)
POTASSIUM SERPL-SCNC: 3.2 MMOL/L — LOW (ref 3.5–5)
PROT SERPL-MCNC: 5 G/DL — LOW (ref 6–8)
RBC # BLD: 5.29 M/UL — SIGNIFICANT CHANGE UP (ref 4.7–6.1)
RBC # FLD: 13.6 % — SIGNIFICANT CHANGE UP (ref 11.5–14.5)
SODIUM SERPL-SCNC: 146 MMOL/L — SIGNIFICANT CHANGE UP (ref 135–146)
SPECIMEN SOURCE: SIGNIFICANT CHANGE UP
TSH SERPL-MCNC: 5.56 UIU/ML — HIGH (ref 0.27–4.2)
WBC # BLD: 12.06 K/UL — HIGH (ref 4.8–10.8)
WBC # FLD AUTO: 12.06 K/UL — HIGH (ref 4.8–10.8)

## 2022-05-18 PROCEDURE — 99222 1ST HOSP IP/OBS MODERATE 55: CPT

## 2022-05-18 PROCEDURE — 99233 SBSQ HOSP IP/OBS HIGH 50: CPT

## 2022-05-18 RX ORDER — POTASSIUM CHLORIDE 20 MEQ
20 PACKET (EA) ORAL ONCE
Refills: 0 | Status: COMPLETED | OUTPATIENT
Start: 2022-05-18 | End: 2022-05-18

## 2022-05-18 RX ORDER — MAGNESIUM SULFATE 500 MG/ML
2 VIAL (ML) INJECTION ONCE
Refills: 0 | Status: COMPLETED | OUTPATIENT
Start: 2022-05-18 | End: 2022-05-18

## 2022-05-18 RX ORDER — CEFTRIAXONE 500 MG/1
1000 INJECTION, POWDER, FOR SOLUTION INTRAMUSCULAR; INTRAVENOUS EVERY 24 HOURS
Refills: 0 | Status: DISCONTINUED | OUTPATIENT
Start: 2022-05-18 | End: 2022-05-19

## 2022-05-18 RX ADMIN — SIMVASTATIN 40 MILLIGRAM(S): 20 TABLET, FILM COATED ORAL at 21:29

## 2022-05-18 RX ADMIN — Medication 50 MILLIGRAM(S): at 05:12

## 2022-05-18 RX ADMIN — Medication 25 GRAM(S): at 12:15

## 2022-05-18 RX ADMIN — Medication 50 MILLIGRAM(S): at 18:46

## 2022-05-18 RX ADMIN — Medication 100 MILLIGRAM(S): at 05:12

## 2022-05-18 RX ADMIN — AMLODIPINE BESYLATE 5 MILLIGRAM(S): 2.5 TABLET ORAL at 05:12

## 2022-05-18 RX ADMIN — DONEPEZIL HYDROCHLORIDE 5 MILLIGRAM(S): 10 TABLET, FILM COATED ORAL at 21:29

## 2022-05-18 RX ADMIN — Medication 20 MILLIEQUIVALENT(S): at 18:53

## 2022-05-18 RX ADMIN — Medication 100 MILLIGRAM(S): at 18:47

## 2022-05-18 RX ADMIN — ENOXAPARIN SODIUM 40 MILLIGRAM(S): 100 INJECTION SUBCUTANEOUS at 10:04

## 2022-05-18 RX ADMIN — PANTOPRAZOLE SODIUM 40 MILLIGRAM(S): 20 TABLET, DELAYED RELEASE ORAL at 05:13

## 2022-05-18 RX ADMIN — Medication 50 MILLIEQUIVALENT(S): at 12:14

## 2022-05-18 RX ADMIN — Medication 500 MICROGRAM(S): at 08:59

## 2022-05-18 RX ADMIN — CLOPIDOGREL BISULFATE 75 MILLIGRAM(S): 75 TABLET, FILM COATED ORAL at 12:16

## 2022-05-18 RX ADMIN — CEFTRIAXONE 100 MILLIGRAM(S): 500 INJECTION, POWDER, FOR SOLUTION INTRAMUSCULAR; INTRAVENOUS at 12:15

## 2022-05-18 RX ADMIN — LISINOPRIL 10 MILLIGRAM(S): 2.5 TABLET ORAL at 05:12

## 2022-05-18 NOTE — SWALLOW BEDSIDE ASSESSMENT ADULT - SWALLOW EVAL: RECOMMENDED FEEDING/EATING TECHNIQUES
position upright (90 degrees)/small sips/bites

## 2022-05-18 NOTE — CONSULT NOTE ADULT - NS ATTEND AMEND GEN_ALL_CORE FT
Patient seen and examined and agree with above except as noted.  Patients history, notes, labs, imaging, vitals and meds reviewed personally.  Patient with h/o dementia brought in for episode of fall (unwitnessed).  Patient found o have UTI and clinically not improving leading to neurology consultation.  Patient on exam appears hard of hearing and does not seem to hear tasks and questions asked of him.  He is moving all extremities symmetrically.  Gives history of falling and having right knee pain.  Perseverates on the number 79 throughout the evaluation (unclear of significance).    Plan as above

## 2022-05-18 NOTE — SWALLOW BEDSIDE ASSESSMENT ADULT - SWALLOW EVAL: RECOMMENDED DIET
NPO with alternate means for nutrition/hydration.
puree w/ thin liquids
puree w/ mildly thick liquids
continue puree w/ mildly thick liquids

## 2022-05-18 NOTE — SWALLOW BEDSIDE ASSESSMENT ADULT - SLP PERTINENT HISTORY OF CURRENT PROBLEM
89 year old male with PMH of HTN, HLD, DM, CAD status post PCI, Alzheimer's dementia brought to the ED from the [facility] after being found on the floor, minimally responsive. Trauma workup showed L1 chronic compression fracture, right frontal, temporal periorbital and facial swelling; otherwise negative for any acute trauma. +Sepsis on admission, UTI. 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
pt is a 89 y/o M w/ PMHx: HTN, HLD, DM, CAD, Alzheimer's dementia brought to the ED from the [facility] after being found on the floor of his apartment. Pt is being treated for toxic metabolic encephalopathy 2' sepsis 2' acute cystitis. +Rhabdomyolysis; pt w/ suspected COPD, emphysematous changes on CT chest.
pt is a 89 y/o M w/ PMHx: HTN, HLD, DM, CAD, Alzheimer's dementia brought to the ED from the [facility] after being found on the floor of his apartment. Pt is being treated for toxic metabolic encephalopathy 2' sepsis 2' acute cystitis. +Rhabdomyolysis; pt w/ suspected COPD, emphysematous changes on CT chest.
pt is a 89 y/o M w/ PMHx: HTN, HLD, DM, CAD, Alzheimer's dementia brought to the ED from the [facility] after being found on the floor of his apartment. Pt is being treated for toxic metabolic encephalopathy 2' sepsis 2' acute cystitis. +Rhabdomyolysis; pt w/ suspected COPD, emphysematous changes on CT chest.

## 2022-05-18 NOTE — CONSULT NOTE ADULT - SUBJECTIVE AND OBJECTIVE BOX
Neurology Consult    Interval History: Patient is examined by the bedside.       Patient is a 89y old  Male who presents with a chief complaint of Sepsis (17 May 2022 13:40)      HPI:  89 year old male PMH of HTN, HLD, DM, CAD status post PCI, Alzheimer's dementia brought to the ED from the [facility] after being found on the floor of his apartment this morning. Pt was found this morning on the floor by staff minimally responsive and unknown what happened prior to him being found on the floor. Pt was well the day prior. Unable to give story at the time of my assessment as pt appears very confused.     In the ED, pt tachy to 105. Labs notable for wbc 15.84, K 5.1 (hemolyzed), T bili 1.9, lactate 3.4, CK 1332. UA grossly positive. Trauma workup showed L1 chronic compression fracture, right frontal, temporal periorbital and facial swelling; otherwise negative for any acute trauma.    Confirmed medrec with pts son Wesly over the phone. Also confirmed DNR/DNI status with pts son and filled out updated MOLST form. (13 May 2022 13:56)      PAST MEDICAL & SURGICAL HISTORY:  Alzheimer disease      Hypertension      Hyperlipidemia      Diabetes mellitus      Coronary artery disease      No significant past surgical history          FAMILY HISTORY:  No pertinent family history in first degree relatives        Social History: (-) x 3    Allergies    No Known Allergies    Intolerances        MEDICATIONS  (STANDING):  allopurinol 100 milliGRAM(s) Oral two times a day  amLODIPine   Tablet 5 milliGRAM(s) Oral daily  cefTRIAXone   IVPB 1000 milliGRAM(s) IV Intermittent every 24 hours  clopidogrel Tablet 75 milliGRAM(s) Oral daily  donepezil 5 milliGRAM(s) Oral at bedtime  enoxaparin Injectable 40 milliGRAM(s) SubCutaneous every 24 hours  ipratropium    for Nebulization 500 MICROGram(s) Nebulizer every 6 hours  lisinopril 10 milliGRAM(s) Oral daily  metoprolol tartrate 50 milliGRAM(s) Oral two times a day  pantoprazole    Tablet 40 milliGRAM(s) Oral before breakfast  simvastatin 40 milliGRAM(s) Oral at bedtime  sodium chloride 0.9%. 1000 milliLiter(s) (50 mL/Hr) IV Continuous <Continuous>    MEDICATIONS  (PRN):          Vital Signs Last 24 Hrs  T(C): 35.6 (18 May 2022 05:00), Max: 36.5 (17 May 2022 20:33)  T(F): 96.1 (18 May 2022 05:00), Max: 97.7 (17 May 2022 20:33)  HR: 71 (18 May 2022 09:33) (68 - 81)  BP: 149/61 (18 May 2022 09:33) (120/77 - 204/83)  BP(mean): --  RR: 18 (18 May 2022 05:00) (18 - 18)  SpO2: 92% (18 May 2022 05:00) (92% - 97%)    Examination:  General:  Appearance is consistent with chronologic age.    Mental status: Perseverating on number 79, follows commands, does not answer appropriately   CN: +Blink to threat b/l, no facial asymmetry  Motor:  RUE/LUE 5/5, RLE 3/5 limited by right knee pain s/p fall, LLE 5/5  Gait: deferred due to safety     Labs:   CBC Full  -  ( 18 May 2022 05:57 )  WBC Count : 12.06 K/uL  RBC Count : 5.29 M/uL  Hemoglobin : 16.0 g/dL  Hematocrit : 44.6 %  Platelet Count - Automated : 217 K/uL  Mean Cell Volume : 84.3 fL  Mean Cell Hemoglobin : 30.2 pg  Mean Cell Hemoglobin Concentration : 35.9 g/dL  Auto Neutrophil # : 9.81 K/uL  Auto Lymphocyte # : 0.58 K/uL  Auto Monocyte # : 1.21 K/uL  Auto Eosinophil # : 0.34 K/uL  Auto Basophil # : 0.04 K/uL  Auto Neutrophil % : 81.4 %  Auto Lymphocyte % : 4.8 %  Auto Monocyte % : 10.0 %  Auto Eosinophil % : 2.8 %  Auto Basophil % : 0.3 %    05-18    146  |  110  |  21<H>  ----------------------------<  147<H>  3.2<L>   |  19  |  0.7    Ca    8.3<L>      18 May 2022 05:57  Phos  2.9     05-17  Mg     1.6     05-18    TPro  5.0<L>  /  Alb  3.1<L>  /  TBili  2.1<H>  /  DBili  x   /  AST  18  /  ALT  18  /  AlkPhos  62  05-18    LIVER FUNCTIONS - ( 18 May 2022 05:57 )  Alb: 3.1 g/dL / Pro: 5.0 g/dL / ALK PHOS: 62 U/L / ALT: 18 U/L / AST: 18 U/L / GGT: x                   Neuroimaging:  NCT:     05-18-22 @ 13:22      < from: CT Head No Cont (05.13.22 @ 10:13) >  IMPRESSION:    1.  No evidence of acute intracranial hemorrhage.    2.  Moderate/severe chronic microvascular changes and parenchymal   atrophy. 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   Incidental right temporal convexity meningioma measuring 1.5 cm.    3.  Right frontal, temporal and periorbital extracalvarial soft tissue   swelling.    < end of copied text >

## 2022-05-18 NOTE — PROGRESS NOTE ADULT - ASSESSMENT
89 y/o man with PMH of HTN, hyperlipidemia, DM type 2, CAD s/p PCI and Alzheimer's dementia was brought [facility] ED [facility] after being found on the floor of his apartment. He was admitted to SDU and is now downgraded to the medical floor.    Severe Sepsis present on admission due to acute cystitis  Urine culture: E. coli sensitive to multiple tested abx  blood cultures negative  c/w Rocephin, Vantin on discharge   ID following    Metabolic encephalopathy likely due to acute cystitis  pt with Alzheimer's dementia on donepezil  pt remains altered and confused per son  reorient frequently, fall precautions  neuro consult noted - f/u rEEG     s/p fall with rhabdomyolysis  encourage PO hydration  PT consulted    ?Episode of Afib with RVR per chart - no strip available for review - likely due to sepsis  pt in regular rhythm initially and on exam today  continue metoprolol  ECHO report reviewed: EF 57%, grade 1 DD, moderately enlarged LA, mild AS    Elevated TB (indirect>>direct) - stable - RUQ US with mild gallbladder sludge  other LFTs WNL -> trend for now    Dysphagia - pureed diet, 1 to 1 feeds    HTN - restarted on metoprolol 50mg q12hr (on toprol 100mg at home but unable to take now - needs meds to be crushed for now)  on amlodipine and lisinopril monitor BP    CAD s/p PCI - on Plavix, statin, metoprolol, lisinopril    DM type 2 - monitor      DVT ppx: Lovenox    Code status: DNR/DNI      PROGRESS NOTE HANDOFF  Anticipate for discharge tomorrow

## 2022-05-18 NOTE — SWALLOW BEDSIDE ASSESSMENT ADULT - SWALLOW EVAL: DIAGNOSIS
Patient ID: Kai is a 73 year old male.    Chief Complaint   Patient presents with   • Cough     symptoms started last wednesday    • Congestion       New/Established? : New Patient    Accompanied by:Unaccompanied today    HPI  Sx x 8 days.  Patient states he started off last week with some fatigue and generalized malaise.  He eventually developed nasal congestion with a cough.  Overall he started to feel better over the last day or 2 but then last night started to feel very rundown again.  His main symptoms are nasal congestion with a deep cough.  He has a little discomfort in his abdominal muscles from coughing so hard.  He has no known history of asthma.  He does have a known history of a heart murmur.    He has had no known fevers and does not complain of chills.    He has not been taking any over-the-counter medications to counter his symptoms.    Patient is scheduled for a vitrectomy on 2/18/2020 at [facility].  He was scheduled to have a last week but it was postponed due to his current illness.  Pt unsure of type of anesthesia to be used; will meet w/ anesthesiologist just prior to surgery.        Review of Systems   Constitutional: Positive for activity change and fatigue. Negative for chills, diaphoresis and fever.   HENT: Positive for congestion and rhinorrhea. Negative for ear discharge, ear pain, mouth sores, nosebleeds, sinus pressure, sinus pain, sneezing, sore throat and trouble swallowing.    Eyes: Negative for pain, discharge and redness.   Respiratory: Positive for cough. Negative for chest tightness, shortness of breath and wheezing.    Cardiovascular: Negative for chest pain.   Gastrointestinal: Negative for diarrhea, nausea and vomiting.   Skin: Negative.  Negative for rash.   Neurological: Negative for dizziness and headaches.       I have reviewed the past medical history, family history, social history, medications and allergies listed in the medical record as obtained by my nursing 
+toleration for puree and thin liquids w/o overt s/s aspiration/penetration
staff and support staff and agree with their documentation.    Past Medical History:   Diagnosis Date   • Erectile dysfunction    • Macular hole of left eye      Past Surgical History:   Procedure Laterality Date   • Colonoscopy       No family history on file.  Social History     Tobacco Use   • Smoking status: Former Smoker     Packs/day: 2.00     Years: 8.00     Pack years: 16.00     Types: Cigarettes     Start date: 1962     Last attempt to quit: 1970     Years since quittin.1   • Smokeless tobacco: Never Used   Substance Use Topics   • Alcohol use: Yes     Alcohol/week: 3.0 standard drinks     Types: 3 Standard drinks or equivalent per week   • Drug use: Not on file        Current Outpatient Medications   Medication Sig Dispense Refill   • azithromycin (ZITHROMAX) 250 MG tablet Take 2 tablets by mouth daily for 1 day, THEN 1 tablet daily for 4 days. 6 tablet 0   • albuterol (PROAIR HFA) 108 (90 Base) MCG/ACT inhaler Inhale 2 puffs into the lungs every 4 hours as needed for Shortness of Breath or Wheezing. 1 Inhaler 0   • testosterone 50 MG/5GM (1%) gel daily. Indications: last dose 2020. pt holding for surgery Pt uses 1 application daily       No current facility-administered medications for this visit.      ALLERGIES:  No Known Allergies        Vitals:    20 0958   BP: 129/67   Pulse: 63   Resp: 18   Temp: 98.5 °F (36.9 °C)   TempSrc: Oral   SpO2: 100%     Physical Exam   Constitutional: He appears well-developed and well-nourished. No distress.   HENT:   Head: Normocephalic and atraumatic.   Right Ear: Tympanic membrane, external ear and ear canal normal.   Left Ear: Tympanic membrane, external ear and ear canal normal.   Nose: Nose normal. Right sinus exhibits no maxillary sinus tenderness and no frontal sinus tenderness. Left sinus exhibits no maxillary sinus tenderness and no frontal sinus tenderness.   Mouth/Throat: Uvula is midline, oropharynx is clear and moist and mucous 
PO trials are not appropriate at this time 2' lethargy and poor mental status.
membranes are normal. No oropharyngeal exudate, posterior oropharyngeal edema or posterior oropharyngeal erythema.   Eyes: Pupils are equal, round, and reactive to light. Conjunctivae and EOM are normal. Right eye exhibits no discharge. Left eye exhibits no discharge.   Neck: Neck supple.   Cardiovascular: Normal rate, regular rhythm and normal heart sounds.   No murmur heard.  Pulmonary/Chest: Effort normal. No respiratory distress. He has wheezes (B/L, also w/ rhonchi B/L). He has no rales.   Lymphadenopathy:     He has no cervical adenopathy.   Skin: Skin is warm and dry. No rash noted. He is not diaphoretic.   Nursing note and vitals reviewed.      Results    No results found for this visit on 02/13/20.        ASSESSMENT:  Problem List Items Addressed This Visit     None      Visit Diagnoses     Acute bronchitis with bronchospasm    -  Primary    Relevant Medications    albuterol (VENTOLIN) nebulizer 2.5 mg (Completed)    Other Relevant Orders    NEBULIZER TREATMENT            PLAN:    Orders Placed This Encounter   • Nebulizer Treatment   • albuterol (VENTOLIN) nebulizer 2.5 mg   • azithromycin (ZITHROMAX) 250 MG tablet   • albuterol (PROAIR HFA) 108 (90 Base) MCG/ACT inhaler   Patient had 1 breathing treatment with albuterol via nebulizer.  Afterwards, lungs were significantly improved with no further rhonchi heard in reduction of wheezes everywhere.  There did remain some wheezing in the right lower lobe at the base of the lung field.    Preliminary chest x-ray review shows increased peribronchial markings bilaterally, more noticeable on the right.  No definite infiltrate seen.  Await radiology reading.    Albuterol inhaler prescribed for symptom control.  Given that patient has outpatient surgery next week, started patient on a Z-Alex.  However, suspect viral etiology and gradual resolution.  I did asked the patient to follow-up with his surgeon before his surgery if he is feeling worse.  Follow-up with PCP 
+toleration for puree and mildly thick liquids w/o overt s/s aspiration/penetration; +unsafe for chewable consistency, +suspected pharyngeal dysphagia w/ thin liquids
as well as needed.    Supportive care discussed with patient and/or guardian(s): rest, fluids, OTC medications.    D/C instructions per AVS, reviewed with patient and/or guardian(s).    Kyle Whipple PA-C    
+mild oral dysphagia for puree and mildly thick liquids w/o overt s/s aspiration/penetration

## 2022-05-18 NOTE — CONSULT NOTE ADULT - ASSESSMENT
Assessment:89 year old male PMH of HTN, HLD, DM, CAD status post PCI, Alzheimer's dementia brought to the ED from the [facility] after being found on the floor of his apartment in the morning. Patient's course was complicated with toxic metabolic encephalopathy to E. coli cystitis and sepsis.    #AMS most likely due to his medical course also has Alzheimer's dementia at baseline    Suggestions:  Medical management as per primary team  PT/OT  Discussed with Dr. Salazar. Pending note to follow  Assessment:89 year old male PMH of HTN, HLD, DM, CAD status post PCI, Alzheimer's dementia brought to the ED from the [facility] after being found on the floor of his apartment in the morning. Patient's course was complicated with toxic metabolic encephalopathy to E. coli cystitis and sepsis.    #AMS most likely due to his medical course also has Alzheimer's dementia at baseline    Suggestions:  Medical management as per primary team  Routine EEG  PT/OT  Discussed with Dr. Salazar. Pending note to follow

## 2022-05-18 NOTE — SWALLOW BEDSIDE ASSESSMENT ADULT - SLP GENERAL OBSERVATIONS
pt received in bed awake confused in no apparent pain. +room air +B/L wrist restraints
pt received in bed awake confused in no apparent pain. +room air
pt received in bed awake confused in no apparent pain +B/L wrist restraints +2L NC

## 2022-05-18 NOTE — PROGRESS NOTE ADULT - SUBJECTIVE AND OBJECTIVE BOX
CC: 89 year old male PMH of HTN, HLD, DM, CAD status post PCI, Alzheimer's dementia brought to the ED from the [facility] after being found on the floor of his apartment this morning. Pt was found this morning on the floor by staff minimally responsive and unknown what happened prior to him being found on the floor. Pt was well the day prior. Unable to give story at the time of my assessment as pt appears very confused.   Tests:   -trauma w/u - +distal L clavicular fx  -echo - 57%, G1DD, mild AS  - UA - 171 wbc, bacteria, LE  - UCX- E. Coli. sensitivities noted  - bx neg  Consults: ID, Ortho   Interventions: antibiotics   DCP:  likely SNF vs [facility] based on PT  Code: DNR/ DNI    Hospital day # : 5    Events Overnight/VS: no sig. events    Subjective: confused, unable to express    Physical Exam:  General: no acute distress  HEENT: atraumatic, normocephalic  Chest: RRR, S1/S2  Lungs: Clear to auscultation bilaterally, no wheeze  Abd: BS audible, soft, non-tender  Ext: no clubbing, cyanosis, no edema  Skin: warm and dry  Neuro/MSK: awake, confused, verbal responds to certain questions, oriented x0-1    New Labs/Imaging/Procedures/Results:                        17.4   11.56 )-----------( 195      ( 17 May 2022 05:59 )             49.6                         15.0   11.20 )-----------( 192      ( 16 May 2022 05:32 )             42.3       17 May 2022 12:17    143    |  108    |  22     ----------------------------<  158    3.2     |  19     |  0.8    17 May 2022 05:59    143    |  106    |  22     ----------------------------<  113    3.8     |  12     |  0.8    16 May 2022 05:32    143    |  106    |  20     ----------------------------<  99     3.5     |  17     |  0.9       Ca    8.7        17 May 2022 12:17  Ca    8.9        17 May 2022 05:59  Ca    8.4        16 May 2022 05:32  Phos  2.9       17 May 2022 05:59  Mg     1.8       17 May 2022 05:59  Mg     1.9       16 May 2022 05:32    .Blood None  05-13 @ 16:43   No growth to date.  --  --  .Blood Blood  05-13 @ 10:42   No growth to date.  --  --  Catheterized Catheterized  05-13 @ 09:30   >100,000 CFU/ml Escherichia coli  --  Escherichia coli        Assessment/Plan:    #AMS likely 2/2 toxic metabolic encephalopathy secondary to E.Coli cystitis + hospital acquired delirium + progression of dementia  #Sepsis POA - resolved  #Dysphagia  #Hx Alzheimer dementia  on admission: wbc elevated at 15.84, tachy to 105 and grossly positive UA; lactate 3.4  found on the floor by staff at [facility], unknown what happened prior to being found, pt well the day before, unable to acquire hx from pt as pt lethargic/obtunded  REMAINS altered, baseline is AAOx2 per son (able to know person and year and sometimes day). Alzheimer's dementia mild per son, pt aware of situation and fully functional prior.  -c/w IV Rocephin (UCX sensitivities reviewed) for now day #6 - transition to PO once closer to DC - can do bactrim or Vantin once closer to DC  -c/w pureed diet, 1 to 1 feeds per s/s  -c/w donepezil  -seen by neurology, do not suspect neurologic etiology  -frequent reorientation and repositioning  -stable for dc      #S/p Fall/ Left clavicular fx  -ortho- no intervention, NWB LUE, sling, pain control  -follow up w/ Dr. West. please call [phone] to schedule an appointment;     #Rhabdomyolysis - resolved  -CK 2000s->300  -encourage PO intake  -c/w PT    #Elevated TB Indirect predominant - stable   RUQ US with mild gallbladder sludge  LFTs WNL  - trend for now    #?Paroxysmal Afib with RVR   new onset on 5/16, however no EKG documentation. did not get home metoprolol 100xr due to failed speech and swallow  -resumed PO metoprolol 50 bid  -monitor off ac due to advanced age    #CAD s/p PCI  #HTN  #HLD  -continue ASA/plavix/statin/BB  -c/w amlodipine 5  -c/w lisinopril 10  -c/w home metoprolol 50 bid (on 100 XR however cannot be crushed)    #T2DM  as per pts son, pt hasn't been on meds because glucose well controlled  A1c 7.3  -monitor FG, start insulin regimen if needed    DVT ppx: lovenox  Activity: PT eval pending  Diet: puree w/ mild thick liquids per s/s - advance as tolerated  Code status: DNR/DNI    #Provider handoff- patient medically stable for dc.   PT unable to evaluate today due to pt's AMS, will re-evaluate in AM, per CM need PT eval to send auth, spoke with son Maynor - wants to discuss another option than [facility] CC: 89 year old male PMH of HTN, HLD, DM, CAD status post PCI, Alzheimer's dementia brought to the ED from the [facility] after being found on the floor of his apartment this morning. Pt was found this morning on the floor by staff minimally responsive and unknown what happened prior to him being found on the floor. Pt was well the day prior. Unable to give story at the time of my assessment as pt appears very confused.   Tests:   -trauma w/u - +distal L clavicular fx  -echo - 57%, G1DD, mild AS  - UA - 171 wbc, bacteria, LE  - UCX- E. Coli. sensitivities noted  - bx neg  Consults: ID [x], Ortho [x], Neuro [x]  Interventions: antibiotics   DCP:  likely SNF vs [facility] based on PT  Code: DNR/ DNI    Hospital day # : 5    Events Overnight/VS: no sig. events    Subjective: confused, unable to express    Physical Exam:  General: no acute distress  HEENT: atraumatic, normocephalic  Chest: RRR, S1/S2  Lungs: Clear to auscultation bilaterally, no wheeze  Abd: BS audible, soft, non-tender  Ext: no clubbing, cyanosis, no edema  Skin: warm and dry  Neuro/MSK: awake, confused, verbal responds to certain questions, oriented x0-1    New Labs/Imaging/Procedures/Results:                        17.4   11.56 )-----------( 195      ( 17 May 2022 05:59 )             49.6                         15.0   11.20 )-----------( 192      ( 16 May 2022 05:32 )             42.3       17 May 2022 12:17    143    |  108    |  22     ----------------------------<  158    3.2     |  19     |  0.8    17 May 2022 05:59    143    |  106    |  22     ----------------------------<  113    3.8     |  12     |  0.8    16 May 2022 05:32    143    |  106    |  20     ----------------------------<  99     3.5     |  17     |  0.9       Ca    8.7        17 May 2022 12:17  Ca    8.9        17 May 2022 05:59  Ca    8.4        16 May 2022 05:32  Phos  2.9       17 May 2022 05:59  Mg     1.8       17 May 2022 05:59  Mg     1.9       16 May 2022 05:32    .Blood None  05-13 @ 16:43   No growth to date.  --  --  .Blood Blood  05-13 @ 10:42   No growth to date.  --  --  Catheterized Catheterized  05-13 @ 09:30   >100,000 CFU/ml Escherichia coli  --  Escherichia coli        Assessment/Plan:    #AMS likely 2/2 toxic metabolic encephalopathy secondary to E.Coli cystitis + hospital acquired delirium + progression of dementia  #Sepsis POA - resolved  #Dysphagia  #Hx Alzheimer dementia  on admission: wbc elevated at 15.84, tachy to 105 and grossly positive UA; lactate 3.4  found on the floor by staff at [facility], unknown what happened prior to being found, pt well the day before, unable to acquire hx from pt as pt lethargic/obtunded  REMAINS altered, baseline is AAOx2 per son (able to know person and year and sometimes day). Alzheimer's dementia mild per son, pt aware of situation and fully functional prior.  -c/w IV Rocephin (UCX sensitivities reviewed) for now day #6 - transition to PO once closer to DC - can do bactrim or Vantin once closer to DC  -c/w pureed diet, 1 to 1 feeds per s/s  -c/w donepezil  -seen by neurology, do not suspect neurologic etiology  -frequent reorientation and repositioning  -stable for dc      #S/p Fall/ Left clavicular fx  -ortho- no intervention, NWB LUE, sling, pain control  -follow up w/ Dr. West. please call [phone] to schedule an appointment;     #Rhabdomyolysis - resolved  -CK 2000s->300  -encourage PO intake  -c/w PT    #Elevated TB Indirect predominant - stable   RUQ US with mild gallbladder sludge  LFTs WNL  - trend for now    #?Paroxysmal Afib with RVR   new onset on 5/16, however no EKG documentation. did not get home metoprolol 100xr due to failed speech and swallow  -resumed PO metoprolol 50 bid  -monitor off ac due to advanced age    #CAD s/p PCI  #HTN  #HLD  -continue ASA/plavix/statin/BB  -c/w amlodipine 5  -c/w lisinopril 10  -c/w home metoprolol 50 bid (on 100 XR however cannot be crushed)    #T2DM  as per pts son, pt hasn't been on meds because glucose well controlled  A1c 7.3  -monitor FG, start insulin regimen if needed    DVT ppx: lovenox  Activity: PT eval pending  Diet: puree w/ mild thick liquids per s/s - advance as tolerated  Code status: DNR/DNI    #Provider handoff- patient medically stable for dc.   PT unable to evaluate today due to pt's AMS, will re-evaluate in AM, per CM need PT eval to send auth, spoke with son Maynor - wants to discuss another option than [facility]

## 2022-05-18 NOTE — PROGRESS NOTE ADULT - SUBJECTIVE AND OBJECTIVE BOX
Pt seen and examined at bedside.    VITAL SIGNS (Last 24 hrs):  T(C): 35.6 (05-18-22 @ 05:00), Max: 36.5 (05-17-22 @ 20:33)  HR: 71 (05-18-22 @ 09:33) (69 - 81)  BP: 149/61 (05-18-22 @ 09:33) (149/61 - 204/83)  RR: 18 (05-18-22 @ 05:00) (18 - 18)  SpO2: 92% (05-18-22 @ 05:00) (92% - 97%)  Wt(kg): --  Daily     Daily     I&O's Summary    17 May 2022 07:01  -  18 May 2022 07:00  --------------------------------------------------------  IN: 600 mL / OUT: 300 mL / NET: 300 mL        PHYSICAL EXAM:  GENERAL: NAD, alert, confused   HEAD:  Atraumatic, Normocephalic  EYES:conjunctiva and sclera clear  NECK: Supple, No JVD  CHEST/LUNG: Clear to auscultation bilaterally; No wheeze  HEART: Regular rate and rhythm; No murmurs, rubs, or gallops  ABDOMEN: Soft, Nontender, Nondistended; Bowel sounds present  EXTREMITIES:  2+ Peripheral Pulses, No clubbing, cyanosis, or edema  SKIN: No rashes or lesions    Labs Reviewed      CBC Full  -  ( 18 May 2022 05:57 )  WBC Count : 12.06 K/uL  Hemoglobin : 16.0 g/dL  Hematocrit : 44.6 %  Platelet Count - Automated : 217 K/uL  Mean Cell Volume : 84.3 fL  Mean Cell Hemoglobin : 30.2 pg  Mean Cell Hemoglobin Concentration : 35.9 g/dL  Auto Neutrophil # : 9.81 K/uL  Auto Lymphocyte # : 0.58 K/uL  Auto Monocyte # : 1.21 K/uL  Auto Eosinophil # : 0.34 K/uL  Auto Basophil # : 0.04 K/uL  Auto Neutrophil % : 81.4 %  Auto Lymphocyte % : 4.8 %  Auto Monocyte % : 10.0 %  Auto Eosinophil % : 2.8 %  Auto Basophil % : 0.3 %    BMP:    05-18 @ 05:57    Blood Urea Nitrogen - 21  Calcium - 8.3  Carbond Dioxide - 19  Chloride - 110  Creatinine - 0.7  Glucose - 147  Potassium - 3.2  Sodium - 146      Hemoglobin A1c -     Urine Culture:  05-13 @ 16:43 Urine culture: --    Culture Results:   No growth to date.  Method Type: --  Organism: --  Organism Identification: --  Specimen Source: .Blood None         MEDICATIONS  (STANDING):  allopurinol 100 milliGRAM(s) Oral two times a day  amLODIPine   Tablet 5 milliGRAM(s) Oral daily  cefTRIAXone   IVPB 1000 milliGRAM(s) IV Intermittent every 24 hours  clopidogrel Tablet 75 milliGRAM(s) Oral daily  donepezil 5 milliGRAM(s) Oral at bedtime  enoxaparin Injectable 40 milliGRAM(s) SubCutaneous every 24 hours  ipratropium    for Nebulization 500 MICROGram(s) Nebulizer every 6 hours  lisinopril 10 milliGRAM(s) Oral daily  metoprolol tartrate 50 milliGRAM(s) Oral two times a day  pantoprazole    Tablet 40 milliGRAM(s) Oral before breakfast  potassium chloride   Powder 20 milliEquivalent(s) Oral once  simvastatin 40 milliGRAM(s) Oral at bedtime  sodium chloride 0.9%. 1000 milliLiter(s) (50 mL/Hr) IV Continuous <Continuous>    MEDICATIONS  (PRN):

## 2022-05-18 NOTE — PROGRESS NOTE ADULT - PROVIDER SPECIALTY LIST ADULT
Hospitalist
Internal Medicine
Critical Care
Hospitalist
Hospitalist
Internal Medicine

## 2022-05-19 ENCOUNTER — TRANSCRIPTION ENCOUNTER (OUTPATIENT)
Age: 87
End: 2022-05-19

## 2022-05-19 VITALS
HEART RATE: 68 BPM | DIASTOLIC BLOOD PRESSURE: 60 MMHG | RESPIRATION RATE: 18 BRPM | SYSTOLIC BLOOD PRESSURE: 126 MMHG | TEMPERATURE: 97 F

## 2022-05-19 LAB
ALBUMIN SERPL ELPH-MCNC: 3.2 G/DL — LOW (ref 3.5–5.2)
ALP SERPL-CCNC: 65 U/L — SIGNIFICANT CHANGE UP (ref 30–115)
ALT FLD-CCNC: 19 U/L — SIGNIFICANT CHANGE UP (ref 0–41)
ANION GAP SERPL CALC-SCNC: 13 MMOL/L — SIGNIFICANT CHANGE UP (ref 7–14)
AST SERPL-CCNC: 18 U/L — SIGNIFICANT CHANGE UP (ref 0–41)
BASOPHILS # BLD AUTO: 0.03 K/UL — SIGNIFICANT CHANGE UP (ref 0–0.2)
BASOPHILS NFR BLD AUTO: 0.3 % — SIGNIFICANT CHANGE UP (ref 0–1)
BILIRUB SERPL-MCNC: 1.8 MG/DL — HIGH (ref 0.2–1.2)
BUN SERPL-MCNC: 16 MG/DL — SIGNIFICANT CHANGE UP (ref 10–20)
CALCIUM SERPL-MCNC: 8.4 MG/DL — LOW (ref 8.5–10.1)
CHLORIDE SERPL-SCNC: 109 MMOL/L — SIGNIFICANT CHANGE UP (ref 98–110)
CO2 SERPL-SCNC: 21 MMOL/L — SIGNIFICANT CHANGE UP (ref 17–32)
CREAT SERPL-MCNC: 0.8 MG/DL — SIGNIFICANT CHANGE UP (ref 0.7–1.5)
EGFR: 82 ML/MIN/1.73M2 — SIGNIFICANT CHANGE UP
EOSINOPHIL # BLD AUTO: 0.29 K/UL — SIGNIFICANT CHANGE UP (ref 0–0.7)
EOSINOPHIL NFR BLD AUTO: 2.6 % — SIGNIFICANT CHANGE UP (ref 0–8)
FOLATE SERPL-MCNC: 2.2 NG/ML — LOW
GLUCOSE BLDC GLUCOMTR-MCNC: 136 MG/DL — HIGH (ref 70–99)
GLUCOSE BLDC GLUCOMTR-MCNC: 148 MG/DL — HIGH (ref 70–99)
GLUCOSE BLDC GLUCOMTR-MCNC: 175 MG/DL — HIGH (ref 70–99)
GLUCOSE SERPL-MCNC: 125 MG/DL — HIGH (ref 70–99)
HCT VFR BLD CALC: 44.6 % — SIGNIFICANT CHANGE UP (ref 42–52)
HGB BLD-MCNC: 15.7 G/DL — SIGNIFICANT CHANGE UP (ref 14–18)
IMM GRANULOCYTES NFR BLD AUTO: 0.6 % — HIGH (ref 0.1–0.3)
LYMPHOCYTES # BLD AUTO: 0.79 K/UL — LOW (ref 1.2–3.4)
LYMPHOCYTES # BLD AUTO: 7.1 % — LOW (ref 20.5–51.1)
MAGNESIUM SERPL-MCNC: 1.9 MG/DL — SIGNIFICANT CHANGE UP (ref 1.8–2.4)
MCHC RBC-ENTMCNC: 29.9 PG — SIGNIFICANT CHANGE UP (ref 27–31)
MCHC RBC-ENTMCNC: 35.2 G/DL — SIGNIFICANT CHANGE UP (ref 32–37)
MCV RBC AUTO: 85 FL — SIGNIFICANT CHANGE UP (ref 80–94)
MONOCYTES # BLD AUTO: 1.21 K/UL — HIGH (ref 0.1–0.6)
MONOCYTES NFR BLD AUTO: 10.9 % — HIGH (ref 1.7–9.3)
NEUTROPHILS # BLD AUTO: 8.74 K/UL — HIGH (ref 1.4–6.5)
NEUTROPHILS NFR BLD AUTO: 78.5 % — HIGH (ref 42.2–75.2)
NRBC # BLD: 0 /100 WBCS — SIGNIFICANT CHANGE UP (ref 0–0)
PLATELET # BLD AUTO: 223 K/UL — SIGNIFICANT CHANGE UP (ref 130–400)
POTASSIUM SERPL-MCNC: 3.8 MMOL/L — SIGNIFICANT CHANGE UP (ref 3.5–5)
POTASSIUM SERPL-SCNC: 3.8 MMOL/L — SIGNIFICANT CHANGE UP (ref 3.5–5)
PROT SERPL-MCNC: 5.2 G/DL — LOW (ref 6–8)
RBC # BLD: 5.25 M/UL — SIGNIFICANT CHANGE UP (ref 4.7–6.1)
RBC # FLD: 13.5 % — SIGNIFICANT CHANGE UP (ref 11.5–14.5)
SARS-COV-2 RNA SPEC QL NAA+PROBE: SIGNIFICANT CHANGE UP
SODIUM SERPL-SCNC: 143 MMOL/L — SIGNIFICANT CHANGE UP (ref 135–146)
VIT B12 SERPL-MCNC: 512 PG/ML — SIGNIFICANT CHANGE UP (ref 232–1245)
WBC # BLD: 11.13 K/UL — HIGH (ref 4.8–10.8)
WBC # FLD AUTO: 11.13 K/UL — HIGH (ref 4.8–10.8)

## 2022-05-19 PROCEDURE — 99239 HOSP IP/OBS DSCHRG MGMT >30: CPT

## 2022-05-19 RX ORDER — ENOXAPARIN SODIUM 100 MG/ML
40 INJECTION SUBCUTANEOUS
Qty: 0 | Refills: 0 | DISCHARGE
Start: 2022-05-19

## 2022-05-19 RX ORDER — METOPROLOL TARTRATE 50 MG
1 TABLET ORAL
Qty: 0 | Refills: 0 | DISCHARGE
Start: 2022-05-19

## 2022-05-19 RX ORDER — AMLODIPINE BESYLATE 2.5 MG/1
5 TABLET ORAL DAILY
Refills: 0 | Status: DISCONTINUED | OUTPATIENT
Start: 2022-05-19 | End: 2022-05-19

## 2022-05-19 RX ORDER — SIMVASTATIN 20 MG/1
1 TABLET, FILM COATED ORAL
Qty: 0 | Refills: 0 | DISCHARGE

## 2022-05-19 RX ORDER — CEFTRIAXONE 500 MG/1
1000 INJECTION, POWDER, FOR SOLUTION INTRAMUSCULAR; INTRAVENOUS
Qty: 0 | Refills: 0 | DISCHARGE
Start: 2022-05-19

## 2022-05-19 RX ORDER — VALSARTAN 80 MG/1
1 TABLET ORAL
Qty: 0 | Refills: 0 | DISCHARGE

## 2022-05-19 RX ORDER — SIMVASTATIN 20 MG/1
1 TABLET, FILM COATED ORAL
Qty: 0 | Refills: 0 | DISCHARGE
Start: 2022-05-19

## 2022-05-19 RX ORDER — FOLIC ACID 0.8 MG
1 TABLET ORAL DAILY
Refills: 0 | Status: DISCONTINUED | OUTPATIENT
Start: 2022-05-19 | End: 2022-05-19

## 2022-05-19 RX ORDER — PANTOPRAZOLE SODIUM 20 MG/1
1 TABLET, DELAYED RELEASE ORAL
Qty: 0 | Refills: 0 | DISCHARGE
Start: 2022-05-19

## 2022-05-19 RX ORDER — MOXIFLOXACIN HYDROCHLORIDE TABLETS, 400 MG 400 MG/1
1 TABLET, FILM COATED ORAL
Qty: 16 | Refills: 0
Start: 2022-05-19 | End: 2022-05-26

## 2022-05-19 RX ORDER — FOLIC ACID 0.8 MG
1 TABLET ORAL
Qty: 0 | Refills: 0 | DISCHARGE
Start: 2022-05-19

## 2022-05-19 RX ORDER — METOPROLOL TARTRATE 50 MG
1 TABLET ORAL
Qty: 0 | Refills: 0 | DISCHARGE

## 2022-05-19 RX ORDER — AMLODIPINE BESYLATE 2.5 MG/1
1 TABLET ORAL
Qty: 0 | Refills: 0 | DISCHARGE

## 2022-05-19 RX ORDER — LISINOPRIL 2.5 MG/1
1 TABLET ORAL
Qty: 0 | Refills: 0 | DISCHARGE
Start: 2022-05-19

## 2022-05-19 RX ORDER — AMLODIPINE BESYLATE 2.5 MG/1
5 TABLET ORAL ONCE
Refills: 0 | Status: COMPLETED | OUTPATIENT
Start: 2022-05-19 | End: 2022-05-19

## 2022-05-19 RX ORDER — AMLODIPINE BESYLATE 2.5 MG/1
1 TABLET ORAL
Qty: 0 | Refills: 0 | DISCHARGE
Start: 2022-05-19

## 2022-05-19 RX ORDER — AMLODIPINE BESYLATE 2.5 MG/1
10 TABLET ORAL DAILY
Refills: 0 | Status: DISCONTINUED | OUTPATIENT
Start: 2022-05-20 | End: 2022-05-19

## 2022-05-19 RX ADMIN — LISINOPRIL 10 MILLIGRAM(S): 2.5 TABLET ORAL at 05:40

## 2022-05-19 RX ADMIN — Medication 1 MILLIGRAM(S): at 12:50

## 2022-05-19 RX ADMIN — Medication 100 MILLIGRAM(S): at 05:40

## 2022-05-19 RX ADMIN — Medication 50 MILLIGRAM(S): at 05:40

## 2022-05-19 RX ADMIN — SODIUM CHLORIDE 50 MILLILITER(S): 9 INJECTION INTRAMUSCULAR; INTRAVENOUS; SUBCUTANEOUS at 07:00

## 2022-05-19 RX ADMIN — CLOPIDOGREL BISULFATE 75 MILLIGRAM(S): 75 TABLET, FILM COATED ORAL at 12:50

## 2022-05-19 RX ADMIN — ENOXAPARIN SODIUM 40 MILLIGRAM(S): 100 INJECTION SUBCUTANEOUS at 08:36

## 2022-05-19 RX ADMIN — AMLODIPINE BESYLATE 5 MILLIGRAM(S): 2.5 TABLET ORAL at 05:40

## 2022-05-19 RX ADMIN — CEFTRIAXONE 100 MILLIGRAM(S): 500 INJECTION, POWDER, FOR SOLUTION INTRAMUSCULAR; INTRAVENOUS at 05:41

## 2022-05-19 RX ADMIN — AMLODIPINE BESYLATE 5 MILLIGRAM(S): 2.5 TABLET ORAL at 08:38

## 2022-05-19 RX ADMIN — PANTOPRAZOLE SODIUM 40 MILLIGRAM(S): 20 TABLET, DELAYED RELEASE ORAL at 05:40

## 2022-05-19 NOTE — PHYSICAL THERAPY INITIAL EVALUATION ADULT - ADDITIONAL COMMENTS
Pt. is a poor historian. As per docs, pt. was ambulating using a rolling walker independently without assist. Pt. is a resident at [facility] living.

## 2022-05-19 NOTE — DISCHARGE NOTE NURSING/CASE MANAGEMENT/SOCIAL WORK - NSDCPEFALRISK_GEN_ALL_CORE
For information on Fall & Injury Prevention, visit: [email or web address] OR  [email or web address] OR  https://www.cdc.gov/steadi/patient.html

## 2022-05-19 NOTE — PHYSICAL THERAPY INITIAL EVALUATION ADULT - GENERAL OBSERVATIONS, REHAB EVAL
13:30-13:39. Pt encountered semifowler in bed in NAD,+gerardo,  is very confused, unable to follow commands. Needed hygenic care, BM in bed. Sylvia WINKLER notified, stated she will clean pt. Will f/u with PT and monitor if change in cognitive status to participate with PT.
Pt. encountered alert and NAD, supine in bed (+)IV lock (+)rock and roller, agreeable to PT IE. Pt.'s niece present for entire session. Pt. left as found, supine in bed (+)alarms (+)call bell in reach (+)IV lock (+)Rock and roller, NAD

## 2022-05-19 NOTE — PHYSICAL THERAPY INITIAL EVALUATION ADULT - PERTINENT HX OF CURRENT PROBLEM, REHAB EVAL
89 year old male PMH of HTN, HLD, DM, CAD status post PCI, Alzheimer's dementia brought to the ED from the [facility] after being found on the floor of his apartment this morning. Pt was found this morning on the floor by staff minimally responsive and unknown what happened prior to him being found on the floor. Pt was well the day prior. Unable to give story at the time of my assessment as pt appears very confused.

## 2022-05-19 NOTE — PHYSICAL THERAPY INITIAL EVALUATION ADULT - LEVEL OF INDEPENDENCE: SIT/STAND, REHAB EVAL
Pt. was unable to perform sit to stand today. Pt. was cued several times to stand up with assistance from therapist, but patient kept leaning backward, moving away from therapist./unable to perform

## 2022-05-19 NOTE — DISCHARGE NOTE PROVIDER - NSDCFUSCHEDAPPT_GEN_ALL_CORE_FT
James Elizabeth  [facility] Physician Replaced by [facility]  Neurology [address] [facility]  Scheduled Appointment: 08/11/2022

## 2022-05-19 NOTE — DISCHARGE NOTE PROVIDER - NSDCMRMEDTOKEN_GEN_ALL_CORE_FT
allopurinol 100 mg oral tablet: 1 tab(s) orally 2 times a day  amLODIPine 10 mg oral tablet: 1 tab(s) orally once a day  cefTRIAXone: 1000 milligram(s) intravenous once a day for 3 days  donepezil 5 mg oral tablet: 1 tab(s) orally once a day (at bedtime)  enoxaparin: 40 milligram(s) subcutaneous once a day  folic acid 1 mg oral tablet: 1 tab(s) orally once a day  glyBURIDE 1.25 mg oral tablet: 1 tab(s) orally once a day  lisinopril 10 mg oral tablet: 1 tab(s) orally once a day  metoprolol tartrate 50 mg oral tablet: 1 tab(s) orally 2 times a day  pantoprazole 40 mg oral delayed release tablet: 1 tab(s) orally once a day (before a meal)  Plavix 75 mg oral tablet: 1 tab(s) orally once a day  simvastatin 40 mg oral tablet: 1 tab(s) orally once a day (at bedtime)   allopurinol 100 mg oral tablet: 1 tab(s) orally 2 times a day  amLODIPine 10 mg oral tablet: 1 tab(s) orally once a day  donepezil 5 mg oral tablet: 1 tab(s) orally once a day (at bedtime)  enoxaparin: 40 milligram(s) subcutaneous once a day  folic acid 1 mg oral tablet: 1 tab(s) orally once a day  glyBURIDE 1.25 mg oral tablet: 1 tab(s) orally once a day  lisinopril 10 mg oral tablet: 1 tab(s) orally once a day  metoprolol tartrate 50 mg oral tablet: 1 tab(s) orally 2 times a day  pantoprazole 40 mg oral delayed release tablet: 1 tab(s) orally once a day (before a meal)  Plavix 75 mg oral tablet: 1 tab(s) orally once a day  simvastatin 40 mg oral tablet: 1 tab(s) orally once a day (at bedtime)

## 2022-05-19 NOTE — DISCHARGE NOTE PROVIDER - HOSPITAL COURSE
89 year old male PMH of HTN, HLD, DM, CAD status post PCI, Alzheimer's dementia brought to the ED from the [facility] after being found on the floor of his apartment this morning. Pt was found this morning on the floor by staff minimally responsive and unknown what happened prior to him being found on the floor. Pt was well the day prior per history.   Per son, patient is AAOx2-3 at baseline, and reportedly able to play games and walk   Workup in the hospital was significant for +distal left clavicular fracture and +E. Coli cystitis.   Seen by ortho, no intervention, sling and follow up outpatient.  Seen by infectious disease. To complete IV antibiotics . Also evaluated by neurology.   Patient remains confused with fluctuating mental status - likely due to hospital acquired delirium in the setting of dementia and metabolic encephalopathy.  Patient stable for discharge to SNF.        89 year old male PMH of HTN, HLD, DM, CAD status post PCI, Alzheimer's dementia brought to the ED from the [facility] after being found on the floor of his apartment this morning. Pt was found this morning on the floor by staff minimally responsive and unknown what happened prior to him being found on the floor. Pt was well the day prior per history.   Per son, patient is AAOx2-3 at baseline, and reportedly able to play games and walk   Workup in the hospital was significant for +distal left clavicular fracture and +E. Coli cystitis.   Seen by ortho, no intervention, sling and follow up outpatient.  Seen by infectious disease. To complete IV antibiotics . Also evaluated by neurology.   Patient remains confused with fluctuating mental status - likely due to hospital acquired delirium in the setting of dementia and metabolic encephalopathy.  Patient stable for discharge to SNF.     I have personally seen and examined patient on the above date.  I discussed the case with Dr. Mcnulty and I agree with findings and plan as detailed per note above, which I have amended where appropriate.

## 2022-05-19 NOTE — DISCHARGE NOTE PROVIDER - NSDCCPCAREPLAN_GEN_ALL_CORE_FT
PRINCIPAL DISCHARGE DIAGNOSIS  Diagnosis: Sepsis due to gram-negative UTI  Assessment and Plan of Treatment: Sepsis is a serious condition that occurs when the body overreacts to an infection. It is also called systemic inflammatory response syndrome (SIRS) with infection. An infection is usually caused by bacteria that attack the body. The body's defense system normally fights off infection within the affected body part. With sepsis, the body overreacts and causes symptoms to occur throughout the body. This leads to uncontrolled and widespread inflammation and clotting in small blood vessels. Blood flow to different body parts decreases and may lead to organ failure. Sepsis requires immediate treatment.  You were treated in the hospital with IV antibiotics, and your symptoms resolved.   Please seek immediate medical attention if you develop fever >100.4 F, feel dizzy, have nausea or vomiting, coughing blood, have sudden trouble breathing or chest pain, severe weakness, or your skin or lips are turning blue.        SECONDARY DISCHARGE DIAGNOSES  Diagnosis: Acute UTI  Assessment and Plan of Treatment:   You were noted either on arrival or during this hospitalization to have a Urinary Tract Infection. You may have already been treated and completed the antibiotics, please refer to the list of medications present on your discharge paperwork. If you notice that there are antibiotics listed, these may be to treat your infection, be sure to complete taking the full course, whether you have symptoms or not, as prescribed.  While taking antibiotics, you may benefit from taking a probiotic such as florastore to help to try and prevent an infectious type of diarrhea known as C Diff. If you notice that you begin having severe watery diarrhea, more than 4-5 episodes a day, please see your Primary Care Doctor or come to the ER to have your stool tested for this infection.   It is not necessary to repeat a urine test to see if the infection is gone, it is assumed that after treatment it should have resolved. However, if you continue to have symptoms, please see your Primary Care doctor or return to the ER.      Diagnosis: Delirium with dementia  Assessment and Plan of Treatment: You became confused with decline in your mental status during your hospital stay, likely due to your prolonged hospital stay and in the setting of recent infection with your history of dementia.  Please follow up with your primary care provider and neurologist for further care and evaluation.

## 2022-05-19 NOTE — DISCHARGE NOTE NURSING/CASE MANAGEMENT/SOCIAL WORK - PATIENT PORTAL LINK FT
You can access the FollowMyHealth Patient Portal offered by [facility] by registering at the following website: [email or web address] By joining [facility]’s FollowMyHealth portal, you will also be able to view your health information using other applications (apps) compatible with our system.

## 2022-05-25 DIAGNOSIS — I25.10 ATHEROSCLEROTIC HEART DISEASE OF NATIVE CORONARY ARTERY WITHOUT ANGINA PECTORIS: ICD-10-CM

## 2022-05-25 DIAGNOSIS — F02.80 DEMENTIA IN OTHER DISEASES CLASSIFIED ELSEWHERE, UNSPECIFIED SEVERITY, WITHOUT BEHAVIORAL DISTURBANCE, PSYCHOTIC DISTURBANCE, MOOD DISTURBANCE, AND ANXIETY: ICD-10-CM

## 2022-05-25 DIAGNOSIS — G30.9 ALZHEIMER'S DISEASE, UNSPECIFIED: ICD-10-CM

## 2022-05-25 DIAGNOSIS — H91.90 UNSPECIFIED HEARING LOSS, UNSPECIFIED EAR: ICD-10-CM

## 2022-05-25 DIAGNOSIS — E11.9 TYPE 2 DIABETES MELLITUS WITHOUT COMPLICATIONS: ICD-10-CM

## 2022-05-25 DIAGNOSIS — R13.10 DYSPHAGIA, UNSPECIFIED: ICD-10-CM

## 2022-05-25 DIAGNOSIS — R22.0 LOCALIZED SWELLING, MASS AND LUMP, HEAD: ICD-10-CM

## 2022-05-25 DIAGNOSIS — S09.90XA UNSPECIFIED INJURY OF HEAD, INITIAL ENCOUNTER: ICD-10-CM

## 2022-05-25 DIAGNOSIS — A41.9 SEPSIS, UNSPECIFIED ORGANISM: ICD-10-CM

## 2022-05-25 DIAGNOSIS — M62.82 RHABDOMYOLYSIS: ICD-10-CM

## 2022-05-25 DIAGNOSIS — M84.48XS PATHOLOGICAL FRACTURE, OTHER SITE, SEQUELA: ICD-10-CM

## 2022-05-25 DIAGNOSIS — E87.2 ACIDOSIS: ICD-10-CM

## 2022-05-25 DIAGNOSIS — S42.002A FRACTURE OF UNSPECIFIED PART OF LEFT CLAVICLE, INITIAL ENCOUNTER FOR CLOSED FRACTURE: ICD-10-CM

## 2022-05-25 DIAGNOSIS — G93.41 METABOLIC ENCEPHALOPATHY: ICD-10-CM

## 2022-05-25 DIAGNOSIS — Z66 DO NOT RESUSCITATE: ICD-10-CM
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
## 2022-05-25 DIAGNOSIS — A41.51 SEPSIS DUE TO ESCHERICHIA COLI [E. COLI]: ICD-10-CM

## 2022-05-25 DIAGNOSIS — N30.00 ACUTE CYSTITIS WITHOUT HEMATURIA: ICD-10-CM

## 2022-05-25 DIAGNOSIS — F05 DELIRIUM DUE TO KNOWN PHYSIOLOGICAL CONDITION: ICD-10-CM

## 2022-05-25 DIAGNOSIS — Z98.61 CORONARY ANGIOPLASTY STATUS: ICD-10-CM

## 2022-08-11 ENCOUNTER — APPOINTMENT (OUTPATIENT)
Dept: NEUROLOGY | Facility: CLINIC | Age: 87
End: 2022-08-11
